# Patient Record
Sex: FEMALE | Race: WHITE | URBAN - METROPOLITAN AREA
[De-identification: names, ages, dates, MRNs, and addresses within clinical notes are randomized per-mention and may not be internally consistent; named-entity substitution may affect disease eponyms.]

---

## 2018-03-19 ENCOUNTER — TELEPHONE (OUTPATIENT)
Dept: INTERNAL MEDICINE CLINIC | Age: 68
End: 2018-03-19

## 2024-09-26 ENCOUNTER — APPOINTMENT (OUTPATIENT)
Dept: CT IMAGING | Age: 74
End: 2024-09-26
Payer: MEDICARE

## 2024-09-26 ENCOUNTER — HOSPITAL ENCOUNTER (INPATIENT)
Age: 74
LOS: 2 days | Discharge: HOME HEALTH | End: 2024-09-28
Attending: EMERGENCY MEDICINE | Admitting: INTERNAL MEDICINE
Payer: MEDICARE

## 2024-09-26 ENCOUNTER — APPOINTMENT (OUTPATIENT)
Dept: GENERAL RADIOLOGY | Age: 74
End: 2024-09-26
Payer: MEDICARE

## 2024-09-26 DIAGNOSIS — I65.02 STENOSIS OF LEFT VERTEBRAL ARTERY: ICD-10-CM

## 2024-09-26 DIAGNOSIS — R42 LIGHTHEADEDNESS: ICD-10-CM

## 2024-09-26 DIAGNOSIS — J44.1 ACUTE EXACERBATION OF CHRONIC OBSTRUCTIVE PULMONARY DISEASE (HCC): ICD-10-CM

## 2024-09-26 DIAGNOSIS — J96.22 ACUTE ON CHRONIC RESPIRATORY FAILURE WITH HYPERCAPNIA: Primary | ICD-10-CM

## 2024-09-26 DIAGNOSIS — I87.1 STENOSIS OF LEFT SUBCLAVIAN VEIN: ICD-10-CM

## 2024-09-26 PROBLEM — J96.20 ACUTE ON CHRONIC RESPIRATORY FAILURE: Status: ACTIVE | Noted: 2024-09-26

## 2024-09-26 LAB
ALBUMIN SERPL-MCNC: 3.6 G/DL (ref 3.4–5)
ALBUMIN/GLOB SERPL: 1.4 {RATIO} (ref 1.1–2.2)
ALP SERPL-CCNC: 110 U/L (ref 40–129)
ALT SERPL-CCNC: 15 U/L (ref 10–40)
ANION GAP SERPL CALCULATED.3IONS-SCNC: 4 MMOL/L (ref 3–16)
AST SERPL-CCNC: 19 U/L (ref 15–37)
BASE EXCESS BLDV CALC-SCNC: 10.7 MMOL/L
BASE EXCESS BLDV CALC-SCNC: 11.3 MMOL/L
BASOPHILS # BLD: 0 K/UL (ref 0–0.2)
BASOPHILS NFR BLD: 0.3 %
BILIRUB SERPL-MCNC: 0.3 MG/DL (ref 0–1)
BUN SERPL-MCNC: 9 MG/DL (ref 7–20)
CALCIUM SERPL-MCNC: 8.3 MG/DL (ref 8.3–10.6)
CHLORIDE SERPL-SCNC: 100 MMOL/L (ref 99–110)
CO2 BLDV-SCNC: 42 MMOL/L
CO2 BLDV-SCNC: 44 MMOL/L
CO2 SERPL-SCNC: 40 MMOL/L (ref 21–32)
COHGB MFR BLDV: 1.9 %
COHGB MFR BLDV: 2 %
CREAT SERPL-MCNC: 0.6 MG/DL (ref 0.6–1.2)
DEPRECATED RDW RBC AUTO: 14.7 % (ref 12.4–15.4)
EKG ATRIAL RATE: 72 BPM
EKG DIAGNOSIS: NORMAL
EKG P-R INTERVAL: 186 MS
EKG Q-T INTERVAL: 366 MS
EKG QRS DURATION: 78 MS
EKG QTC CALCULATION (BAZETT): 400 MS
EKG R AXIS: 36 DEGREES
EKG T AXIS: 92 DEGREES
EKG VENTRICULAR RATE: 72 BPM
EOSINOPHIL # BLD: 0.2 K/UL (ref 0–0.6)
EOSINOPHIL NFR BLD: 3 %
FLUAV RNA UPPER RESP QL NAA+PROBE: NEGATIVE
FLUBV AG NPH QL: NEGATIVE
GFR SERPLBLD CREATININE-BSD FMLA CKD-EPI: >90 ML/MIN/{1.73_M2}
GLUCOSE SERPL-MCNC: 169 MG/DL (ref 70–99)
HCO3 BLDV-SCNC: 40 MMOL/L (ref 23–29)
HCO3 BLDV-SCNC: 41 MMOL/L (ref 23–29)
HCT VFR BLD AUTO: 38.3 % (ref 36–48)
HGB BLD-MCNC: 12.2 G/DL (ref 12–16)
INR PPP: 2.04 (ref 0.85–1.15)
LYMPHOCYTES # BLD: 0.9 K/UL (ref 1–5.1)
LYMPHOCYTES NFR BLD: 16.9 %
MAGNESIUM SERPL-MCNC: 2.15 MG/DL (ref 1.8–2.4)
MCH RBC QN AUTO: 33.1 PG (ref 26–34)
MCHC RBC AUTO-ENTMCNC: 32 G/DL (ref 31–36)
MCV RBC AUTO: 103.5 FL (ref 80–100)
METHGB MFR BLDV: 0.5 %
METHGB MFR BLDV: 0.7 %
MONOCYTES # BLD: 0.4 K/UL (ref 0–1.3)
MONOCYTES NFR BLD: 7 %
NEUTROPHILS # BLD: 3.9 K/UL (ref 1.7–7.7)
NEUTROPHILS NFR BLD: 72.8 %
NT-PROBNP SERPL-MCNC: 711 PG/ML (ref 0–124)
O2 THERAPY: ABNORMAL
O2 THERAPY: ABNORMAL
PCO2 BLDV: 74.2 MMHG (ref 40–50)
PCO2 BLDV: 86.4 MMHG (ref 40–50)
PH BLDV: 7.29 [PH] (ref 7.35–7.45)
PH BLDV: 7.34 [PH] (ref 7.35–7.45)
PLATELET # BLD AUTO: 135 K/UL (ref 135–450)
PMV BLD AUTO: 10 FL (ref 5–10.5)
PO2 BLDV: 31 MMHG
PO2 BLDV: 36 MMHG
POTASSIUM SERPL-SCNC: 4.2 MMOL/L (ref 3.5–5.1)
PROCALCITONIN SERPL IA-MCNC: <0.02 NG/ML (ref 0–0.15)
PROT SERPL-MCNC: 6.2 G/DL (ref 6.4–8.2)
PROTHROMBIN TIME: 23.1 SEC (ref 11.9–14.9)
RBC # BLD AUTO: 3.7 M/UL (ref 4–5.2)
SAO2 % BLDV: 57 %
SAO2 % BLDV: 70 %
SARS-COV-2 RDRP RESP QL NAA+PROBE: NOT DETECTED
SODIUM SERPL-SCNC: 144 MMOL/L (ref 136–145)
TROPONIN, HIGH SENSITIVITY: 11 NG/L (ref 0–14)
WBC # BLD AUTO: 5.3 K/UL (ref 4–11)

## 2024-09-26 PROCEDURE — 70496 CT ANGIOGRAPHY HEAD: CPT

## 2024-09-26 PROCEDURE — 70450 CT HEAD/BRAIN W/O DYE: CPT

## 2024-09-26 PROCEDURE — 93010 ELECTROCARDIOGRAM REPORT: CPT | Performed by: INTERNAL MEDICINE

## 2024-09-26 PROCEDURE — 6360000002 HC RX W HCPCS: Performed by: INTERNAL MEDICINE

## 2024-09-26 PROCEDURE — 6370000000 HC RX 637 (ALT 250 FOR IP): Performed by: PHYSICIAN ASSISTANT

## 2024-09-26 PROCEDURE — 71045 X-RAY EXAM CHEST 1 VIEW: CPT

## 2024-09-26 PROCEDURE — 6360000004 HC RX CONTRAST MEDICATION: Performed by: PHYSICIAN ASSISTANT

## 2024-09-26 PROCEDURE — 87081 CULTURE SCREEN ONLY: CPT

## 2024-09-26 PROCEDURE — 84145 PROCALCITONIN (PCT): CPT

## 2024-09-26 PROCEDURE — 83880 ASSAY OF NATRIURETIC PEPTIDE: CPT

## 2024-09-26 PROCEDURE — 94150 VITAL CAPACITY TEST: CPT

## 2024-09-26 PROCEDURE — 36415 COLL VENOUS BLD VENIPUNCTURE: CPT

## 2024-09-26 PROCEDURE — 87635 SARS-COV-2 COVID-19 AMP PRB: CPT

## 2024-09-26 PROCEDURE — 94640 AIRWAY INHALATION TREATMENT: CPT

## 2024-09-26 PROCEDURE — 85610 PROTHROMBIN TIME: CPT

## 2024-09-26 PROCEDURE — 84484 ASSAY OF TROPONIN QUANT: CPT

## 2024-09-26 PROCEDURE — 87449 NOS EACH ORGANISM AG IA: CPT

## 2024-09-26 PROCEDURE — 82803 BLOOD GASES ANY COMBINATION: CPT

## 2024-09-26 PROCEDURE — 6370000000 HC RX 637 (ALT 250 FOR IP): Performed by: INTERNAL MEDICINE

## 2024-09-26 PROCEDURE — 6360000002 HC RX W HCPCS: Performed by: PHYSICIAN ASSISTANT

## 2024-09-26 PROCEDURE — 2700000000 HC OXYGEN THERAPY PER DAY

## 2024-09-26 PROCEDURE — 80053 COMPREHEN METABOLIC PANEL: CPT

## 2024-09-26 PROCEDURE — 6370000000 HC RX 637 (ALT 250 FOR IP): Performed by: NURSE PRACTITIONER

## 2024-09-26 PROCEDURE — 1200000000 HC SEMI PRIVATE

## 2024-09-26 PROCEDURE — 96374 THER/PROPH/DIAG INJ IV PUSH: CPT

## 2024-09-26 PROCEDURE — 87804 INFLUENZA ASSAY W/OPTIC: CPT

## 2024-09-26 PROCEDURE — 83735 ASSAY OF MAGNESIUM: CPT

## 2024-09-26 PROCEDURE — 2580000003 HC RX 258: Performed by: INTERNAL MEDICINE

## 2024-09-26 PROCEDURE — 94660 CPAP INITIATION&MGMT: CPT

## 2024-09-26 PROCEDURE — 99285 EMERGENCY DEPT VISIT HI MDM: CPT

## 2024-09-26 PROCEDURE — 94760 N-INVAS EAR/PLS OXIMETRY 1: CPT

## 2024-09-26 PROCEDURE — 96375 TX/PRO/DX INJ NEW DRUG ADDON: CPT

## 2024-09-26 PROCEDURE — 93005 ELECTROCARDIOGRAM TRACING: CPT | Performed by: EMERGENCY MEDICINE

## 2024-09-26 PROCEDURE — 87040 BLOOD CULTURE FOR BACTERIA: CPT

## 2024-09-26 PROCEDURE — 85025 COMPLETE CBC W/AUTO DIFF WBC: CPT

## 2024-09-26 RX ORDER — ROPINIROLE 1 MG/1
1 TABLET, FILM COATED ORAL NIGHTLY
Status: DISCONTINUED | OUTPATIENT
Start: 2024-09-26 | End: 2024-09-28 | Stop reason: HOSPADM

## 2024-09-26 RX ORDER — IOPAMIDOL 755 MG/ML
75 INJECTION, SOLUTION INTRAVASCULAR
Status: COMPLETED | OUTPATIENT
Start: 2024-09-26 | End: 2024-09-26

## 2024-09-26 RX ORDER — SOTALOL HYDROCHLORIDE 80 MG/1
80 TABLET ORAL 2 TIMES DAILY
COMMUNITY

## 2024-09-26 RX ORDER — MONTELUKAST SODIUM 10 MG/1
10 TABLET ORAL DAILY
COMMUNITY
Start: 2024-06-14

## 2024-09-26 RX ORDER — NICOTINE 21 MG/24HR
1 PATCH, TRANSDERMAL 24 HOURS TRANSDERMAL DAILY
Status: DISCONTINUED | OUTPATIENT
Start: 2024-09-26 | End: 2024-09-28 | Stop reason: HOSPADM

## 2024-09-26 RX ORDER — FUROSEMIDE 20 MG
20 TABLET ORAL DAILY PRN
COMMUNITY
Start: 2024-06-14

## 2024-09-26 RX ORDER — SODIUM CHLORIDE 0.9 % (FLUSH) 0.9 %
5-40 SYRINGE (ML) INJECTION EVERY 12 HOURS SCHEDULED
Status: DISCONTINUED | OUTPATIENT
Start: 2024-09-26 | End: 2024-09-28 | Stop reason: HOSPADM

## 2024-09-26 RX ORDER — BUSPIRONE HYDROCHLORIDE 5 MG/1
5 TABLET ORAL 3 TIMES DAILY
COMMUNITY
Start: 2024-06-17

## 2024-09-26 RX ORDER — ACETAMINOPHEN 650 MG/1
650 SUPPOSITORY RECTAL EVERY 6 HOURS PRN
Status: DISCONTINUED | OUTPATIENT
Start: 2024-09-26 | End: 2024-09-28 | Stop reason: HOSPADM

## 2024-09-26 RX ORDER — METHYLPREDNISOLONE SODIUM SUCCINATE 125 MG/2ML
80 INJECTION, POWDER, LYOPHILIZED, FOR SOLUTION INTRAMUSCULAR; INTRAVENOUS EVERY 8 HOURS
Status: DISCONTINUED | OUTPATIENT
Start: 2024-09-27 | End: 2024-09-27

## 2024-09-26 RX ORDER — POLYETHYLENE GLYCOL 3350 17 G/17G
17 POWDER, FOR SOLUTION ORAL DAILY PRN
Status: DISCONTINUED | OUTPATIENT
Start: 2024-09-26 | End: 2024-09-28 | Stop reason: HOSPADM

## 2024-09-26 RX ORDER — TRAMADOL HYDROCHLORIDE 50 MG/1
50 TABLET ORAL ONCE
Status: COMPLETED | OUTPATIENT
Start: 2024-09-26 | End: 2024-09-26

## 2024-09-26 RX ORDER — FLUTICASONE FUROATE, UMECLIDINIUM BROMIDE AND VILANTEROL TRIFENATATE 200; 62.5; 25 UG/1; UG/1; UG/1
1 POWDER RESPIRATORY (INHALATION) DAILY
COMMUNITY
Start: 2024-06-14

## 2024-09-26 RX ORDER — DOXYCYCLINE HYCLATE 100 MG
100 TABLET ORAL EVERY 12 HOURS SCHEDULED
Status: DISCONTINUED | OUTPATIENT
Start: 2024-09-26 | End: 2024-09-28 | Stop reason: HOSPADM

## 2024-09-26 RX ORDER — ACETAMINOPHEN 325 MG/1
650 TABLET ORAL EVERY 6 HOURS PRN
Status: DISCONTINUED | OUTPATIENT
Start: 2024-09-26 | End: 2024-09-28 | Stop reason: HOSPADM

## 2024-09-26 RX ORDER — ENOXAPARIN SODIUM 100 MG/ML
40 INJECTION SUBCUTANEOUS NIGHTLY
Status: DISCONTINUED | OUTPATIENT
Start: 2024-09-26 | End: 2024-09-26

## 2024-09-26 RX ORDER — ROPINIROLE 1 MG/1
1 TABLET, FILM COATED ORAL NIGHTLY
COMMUNITY
Start: 2024-06-14

## 2024-09-26 RX ORDER — ONDANSETRON 4 MG/1
4 TABLET, ORALLY DISINTEGRATING ORAL EVERY 8 HOURS PRN
Status: DISCONTINUED | OUTPATIENT
Start: 2024-09-26 | End: 2024-09-28 | Stop reason: HOSPADM

## 2024-09-26 RX ORDER — ACETAMINOPHEN 325 MG/1
650 TABLET ORAL ONCE
Status: COMPLETED | OUTPATIENT
Start: 2024-09-26 | End: 2024-09-26

## 2024-09-26 RX ORDER — ALBUTEROL SULFATE 90 UG/1
2 INHALANT RESPIRATORY (INHALATION) EVERY 6 HOURS PRN
Status: DISCONTINUED | OUTPATIENT
Start: 2024-09-26 | End: 2024-09-28 | Stop reason: HOSPADM

## 2024-09-26 RX ORDER — MONTELUKAST SODIUM 10 MG/1
10 TABLET ORAL DAILY
Status: DISCONTINUED | OUTPATIENT
Start: 2024-09-27 | End: 2024-09-28 | Stop reason: HOSPADM

## 2024-09-26 RX ORDER — ONDANSETRON 2 MG/ML
4 INJECTION INTRAMUSCULAR; INTRAVENOUS EVERY 6 HOURS PRN
Status: DISCONTINUED | OUTPATIENT
Start: 2024-09-26 | End: 2024-09-28 | Stop reason: HOSPADM

## 2024-09-26 RX ORDER — TRAZODONE HYDROCHLORIDE 100 MG/1
100 TABLET ORAL NIGHTLY
Status: DISCONTINUED | OUTPATIENT
Start: 2024-09-26 | End: 2024-09-28 | Stop reason: HOSPADM

## 2024-09-26 RX ORDER — BUSPIRONE HYDROCHLORIDE 5 MG/1
5 TABLET ORAL 3 TIMES DAILY
Status: DISCONTINUED | OUTPATIENT
Start: 2024-09-26 | End: 2024-09-28 | Stop reason: HOSPADM

## 2024-09-26 RX ORDER — WARFARIN SODIUM 5 MG/1
5 TABLET ORAL ONCE
Status: COMPLETED | OUTPATIENT
Start: 2024-09-26 | End: 2024-09-26

## 2024-09-26 RX ORDER — ESCITALOPRAM OXALATE 10 MG/1
10 TABLET ORAL DAILY
COMMUNITY
Start: 2024-06-14

## 2024-09-26 RX ORDER — SODIUM CHLORIDE, SODIUM LACTATE, POTASSIUM CHLORIDE, CALCIUM CHLORIDE 600; 310; 30; 20 MG/100ML; MG/100ML; MG/100ML; MG/100ML
INJECTION, SOLUTION INTRAVENOUS CONTINUOUS
Status: DISCONTINUED | OUTPATIENT
Start: 2024-09-26 | End: 2024-09-27

## 2024-09-26 RX ORDER — IPRATROPIUM BROMIDE AND ALBUTEROL SULFATE 2.5; .5 MG/3ML; MG/3ML
1 SOLUTION RESPIRATORY (INHALATION)
Status: DISCONTINUED | OUTPATIENT
Start: 2024-09-26 | End: 2024-09-26

## 2024-09-26 RX ORDER — GUAIFENESIN 600 MG/1
600 TABLET, EXTENDED RELEASE ORAL 2 TIMES DAILY
Status: DISCONTINUED | OUTPATIENT
Start: 2024-09-26 | End: 2024-09-28 | Stop reason: HOSPADM

## 2024-09-26 RX ORDER — SODIUM CHLORIDE 0.9 % (FLUSH) 0.9 %
5-40 SYRINGE (ML) INJECTION PRN
Status: DISCONTINUED | OUTPATIENT
Start: 2024-09-26 | End: 2024-09-28 | Stop reason: HOSPADM

## 2024-09-26 RX ORDER — IPRATROPIUM BROMIDE AND ALBUTEROL SULFATE 2.5; .5 MG/3ML; MG/3ML
1 SOLUTION RESPIRATORY (INHALATION) ONCE
Status: COMPLETED | OUTPATIENT
Start: 2024-09-26 | End: 2024-09-26

## 2024-09-26 RX ORDER — LEVOTHYROXINE SODIUM 50 UG/1
50 TABLET ORAL DAILY
Status: DISCONTINUED | OUTPATIENT
Start: 2024-09-27 | End: 2024-09-28 | Stop reason: HOSPADM

## 2024-09-26 RX ORDER — PROCHLORPERAZINE EDISYLATE 5 MG/ML
5 INJECTION INTRAMUSCULAR; INTRAVENOUS ONCE
Status: COMPLETED | OUTPATIENT
Start: 2024-09-26 | End: 2024-09-26

## 2024-09-26 RX ORDER — ATORVASTATIN CALCIUM 80 MG/1
80 TABLET, FILM COATED ORAL
Status: DISCONTINUED | OUTPATIENT
Start: 2024-09-26 | End: 2024-09-28 | Stop reason: HOSPADM

## 2024-09-26 RX ORDER — IPRATROPIUM BROMIDE AND ALBUTEROL SULFATE 2.5; .5 MG/3ML; MG/3ML
2 SOLUTION RESPIRATORY (INHALATION) ONCE
Status: COMPLETED | OUTPATIENT
Start: 2024-09-26 | End: 2024-09-26

## 2024-09-26 RX ORDER — ESCITALOPRAM OXALATE 10 MG/1
10 TABLET ORAL DAILY
Status: DISCONTINUED | OUTPATIENT
Start: 2024-09-27 | End: 2024-09-28 | Stop reason: HOSPADM

## 2024-09-26 RX ORDER — METHYLPREDNISOLONE SODIUM SUCCINATE 125 MG/2ML
125 INJECTION, POWDER, LYOPHILIZED, FOR SOLUTION INTRAMUSCULAR; INTRAVENOUS ONCE
Status: COMPLETED | OUTPATIENT
Start: 2024-09-26 | End: 2024-09-26

## 2024-09-26 RX ORDER — SODIUM CHLORIDE 9 MG/ML
INJECTION, SOLUTION INTRAVENOUS PRN
Status: DISCONTINUED | OUTPATIENT
Start: 2024-09-26 | End: 2024-09-28 | Stop reason: HOSPADM

## 2024-09-26 RX ORDER — BUDESONIDE AND FORMOTEROL FUMARATE DIHYDRATE 160; 4.5 UG/1; UG/1
2 AEROSOL RESPIRATORY (INHALATION)
Status: DISCONTINUED | OUTPATIENT
Start: 2024-09-26 | End: 2024-09-28 | Stop reason: HOSPADM

## 2024-09-26 RX ORDER — SOTALOL HYDROCHLORIDE 80 MG/1
80 TABLET ORAL 2 TIMES DAILY
Status: DISCONTINUED | OUTPATIENT
Start: 2024-09-26 | End: 2024-09-28 | Stop reason: HOSPADM

## 2024-09-26 RX ADMIN — ACETAMINOPHEN 325MG 650 MG: 325 TABLET ORAL at 13:30

## 2024-09-26 RX ADMIN — IPRATROPIUM BROMIDE AND ALBUTEROL SULFATE 1 DOSE: 2.5; .5 SOLUTION RESPIRATORY (INHALATION) at 14:41

## 2024-09-26 RX ADMIN — SODIUM CHLORIDE, PRESERVATIVE FREE 10 ML: 5 INJECTION INTRAVENOUS at 21:31

## 2024-09-26 RX ADMIN — BUSPIRONE HYDROCHLORIDE 5 MG: 5 TABLET ORAL at 23:53

## 2024-09-26 RX ADMIN — IPRATROPIUM BROMIDE AND ALBUTEROL SULFATE 1 DOSE: 2.5; .5 SOLUTION RESPIRATORY (INHALATION) at 19:42

## 2024-09-26 RX ADMIN — PROCHLORPERAZINE EDISYLATE 5 MG: 5 INJECTION INTRAMUSCULAR; INTRAVENOUS at 16:58

## 2024-09-26 RX ADMIN — IOPAMIDOL 75 ML: 755 INJECTION, SOLUTION INTRAVENOUS at 13:55

## 2024-09-26 RX ADMIN — TRAMADOL HYDROCHLORIDE 50 MG: 50 TABLET ORAL at 23:53

## 2024-09-26 RX ADMIN — SODIUM CHLORIDE, POTASSIUM CHLORIDE, SODIUM LACTATE AND CALCIUM CHLORIDE: 600; 310; 30; 20 INJECTION, SOLUTION INTRAVENOUS at 21:34

## 2024-09-26 RX ADMIN — GUAIFENESIN 600 MG: 600 TABLET ORAL at 21:17

## 2024-09-26 RX ADMIN — ATORVASTATIN CALCIUM 80 MG: 80 TABLET, FILM COATED ORAL at 23:53

## 2024-09-26 RX ADMIN — TRAZODONE HYDROCHLORIDE 100 MG: 100 TABLET ORAL at 23:53

## 2024-09-26 RX ADMIN — IPRATROPIUM BROMIDE AND ALBUTEROL SULFATE 2 DOSE: 2.5; .5 SOLUTION RESPIRATORY (INHALATION) at 16:22

## 2024-09-26 RX ADMIN — ACETAMINOPHEN 325MG 650 MG: 325 TABLET ORAL at 21:18

## 2024-09-26 RX ADMIN — WARFARIN SODIUM 5 MG: 5 TABLET ORAL at 23:53

## 2024-09-26 RX ADMIN — ROPINIROLE 1 MG: 1 TABLET, FILM COATED ORAL at 23:54

## 2024-09-26 RX ADMIN — DOXYCYCLINE HYCLATE 100 MG: 100 TABLET, COATED ORAL at 21:17

## 2024-09-26 RX ADMIN — METHYLPREDNISOLONE SODIUM SUCCINATE 125 MG: 125 INJECTION INTRAMUSCULAR; INTRAVENOUS at 16:36

## 2024-09-26 RX ADMIN — WATER 1000 MG: 1 INJECTION INTRAMUSCULAR; INTRAVENOUS; SUBCUTANEOUS at 21:18

## 2024-09-26 ASSESSMENT — PAIN DESCRIPTION - ORIENTATION
ORIENTATION: LEFT;RIGHT
ORIENTATION: OTHER (COMMENT)
ORIENTATION: MID;LOWER
ORIENTATION: MID
ORIENTATION: LEFT;RIGHT

## 2024-09-26 ASSESSMENT — LIFESTYLE VARIABLES
HOW MANY STANDARD DRINKS CONTAINING ALCOHOL DO YOU HAVE ON A TYPICAL DAY: 1 OR 2
HOW OFTEN DO YOU HAVE A DRINK CONTAINING ALCOHOL: MONTHLY OR LESS

## 2024-09-26 ASSESSMENT — PAIN SCALES - GENERAL
PAINLEVEL_OUTOF10: 5
PAINLEVEL_OUTOF10: 6
PAINLEVEL_OUTOF10: 6
PAINLEVEL_OUTOF10: 7
PAINLEVEL_OUTOF10: 7
PAINLEVEL_OUTOF10: 10
PAINLEVEL_OUTOF10: 6

## 2024-09-26 ASSESSMENT — PAIN DESCRIPTION - PAIN TYPE
TYPE: CHRONIC PAIN
TYPE: CHRONIC PAIN

## 2024-09-26 ASSESSMENT — PAIN - FUNCTIONAL ASSESSMENT
PAIN_FUNCTIONAL_ASSESSMENT: ACTIVITIES ARE NOT PREVENTED
PAIN_FUNCTIONAL_ASSESSMENT: 0-10

## 2024-09-26 ASSESSMENT — PAIN DESCRIPTION - ONSET: ONSET: ON-GOING

## 2024-09-26 ASSESSMENT — PAIN DESCRIPTION - DESCRIPTORS
DESCRIPTORS: ACHING

## 2024-09-26 ASSESSMENT — PAIN DESCRIPTION - LOCATION
LOCATION: HEAD;LEG
LOCATION: HEAD;BACK
LOCATION: BACK
LOCATION: HEAD

## 2024-09-26 ASSESSMENT — PAIN DESCRIPTION - FREQUENCY: FREQUENCY: CONTINUOUS

## 2024-09-26 ASSESSMENT — PAIN DESCRIPTION - DIRECTION: RADIATING_TOWARDS: NO

## 2024-09-26 NOTE — PROGRESS NOTES
Pt started on bipap at this time, pt refusing full face mask. Doing well with nasal mask, minimal leak.

## 2024-09-26 NOTE — ED NOTES
ED SBAR report provider to REGINO Carrillo. Patient to be transported to Room 5281 via stretcher by RN.Patient transported with bedside cardiac monitor. IV site clean, dry, and intact. MEWS score and pain assessed as 4/10 and documented. Updated patient on plan of care.

## 2024-09-26 NOTE — PROGRESS NOTES
Patient arrived to unit at approximately 1855 via stretcher. Patient alert and oriented x4, on 2 liters nasal cannula upon arrival.Patient c/o head and back pain which patient stated is chronic pain. Skin assessment completed upon arrival and documented. Telemetry hooked up , call light in reach and patient oriented to room and plan of care.

## 2024-09-26 NOTE — PROGRESS NOTES
[facility]  Respiratory Therapy  Patient Education      Name:  Amanda Smith  Medical Record Number:  [id number]  Age: 73 y.o.   : 1950  Today's Date:  2024  Room:  Y7H-0746/5281-01         Assessment      BP (!) 160/80   Pulse 77   Temp 98.2 °F (36.8 °C) (Oral)   Resp 17   Ht (S) 1.651 m (5' 5\")   Wt (S) 79.6 kg (175 lb 7.8 oz)   SpO2 98%   BMI 29.20 kg/m²     Patient Active Problem List   Diagnosis    Cardiac dysrhythmia    Other and unspecified hyperlipidemia    Hearing loss    Diverticulitis of colon    Hemorrhoids    COPD (chronic obstructive pulmonary disease) (HCC)    Pneumonia due to organism    Spinal stenosis, lumbar region, without neurogenic claudication    Mononeuritis    S/P appendectomy    History of back surgery    History of thyroidectomy, total    Edema    Acute on chronic respiratory failure (HCC)       Social History  Social History     Tobacco Use    Smoking status: Every Day     Current packs/day: 1.00     Average packs/day: 1 pack/day for 30.0 years (30.0 ttl pk-yrs)     Types: Cigarettes   Substance Use Topics    Alcohol use: Yes     Comment: Occasional alchol use       Modality:     HHN and IS      Education       Patient/caregiver was educated on the proper method of use:  Yes        Level of patient/caregiver understanding able to:  Verbalize understanding and Demonstrate understanding     Education status:   Provided instructions on medications, Provided instructions on technique and indications, and Provided instructions on adverse reactions      Response to education:    Very Good     Teaching Time: 5  minutes         Electronically signed by Mahi Gomez RCP on 2024 at 7:53 PM

## 2024-09-26 NOTE — H&P
Hospital Medicine History & Physical      PCP: Zackery Powers MD    Date of Admission: 9/26/2024    Date of Service: Pt seen/examined and Admitted with expected LOS greater than two midnights due to medical therapy.     Chief Complaint:      Shortness of breath  History Of Present Illness:      73 y.o. female who presented to the emergency room with a chief complaint of worsening of shortness of breath associated with lightheadedness which started 2 days ago.  Patient states that she is on home oxygen 3 L chronically due to her history of COPD.    EMS was called due to worsening of shortness of breath by the time EMS arrived patient was noticed to be in respiratory distress with bilateral wheezing.  Patient received breathing treatment and also she has been complaining of dizziness which she describes as lightheadedness started yesterday around 3 PM  Patient states that every time she sits up and stands up she feels lightheaded and dizzy she denies having any thoughts of vertigo.      Patient denies any muscle bruisability no loss of sensation no loss of consciousness no blurry vision double vision or visual changes.  Patient denies any muscle weakness or paralysis.  Patient is currently anticoagulated on warfarin due to her history of chronic atrial fibrillation.  She has been also noticing some headaches bilaterally bitemporal since yesterday.  Patient is currently on sotalol for the A-fib        The workup in the emergency room notable for:    Upon arrival to the emergency room patient was started with intense nebulizers O2 supplement therapy  VBG showed pH 7.28 pCO2 of 86 patient started on BiPAP ventilation in the emergency room and she tolerated it well.      Patient past medical history significant for:  Past Medical History:          Diagnosis Date    Cardiac dysrhythmia, unspecified     Chronic airway obstruction, not elsewhere classified (HCC)     Diverticulitis of colon (without mention of  normal.  No rashes or lesions.  Neurologic:  Neurovascularly intact, grossly non-focal.  Psychiatric:  Alert and oriented, thought content appropriate, normal insight  Capillary Refill: Brisk,3 seconds, normal  Peripheral Pulses: +2 palpable, equal bilaterally       Labs:     Recent Labs     09/26/24  1307   WBC 5.3   HGB 12.2   HCT 38.3        Recent Labs     09/26/24  1307      K 4.2      CO2 40*   BUN 9   CREATININE 0.6   CALCIUM 8.3     Recent Labs     09/26/24  1307   AST 19   ALT 15   BILITOT 0.3   ALKPHOS 110     Recent Labs     09/26/24  1345   INR 2.04*     Recent Labs     09/26/24  1307   TROPHS 11       Urinalysis:    No results found for: \"NITRU\", \"WBCUA\", \"BACTERIA\", \"RBCUA\", \"BLOODU\", \"SPECGRAV\", \"GLUCOSEU\"    Radiology:     CXR: I have reviewed the CXR.   EKG:  I have reviewed the EKG.     CT HEAD WO CONTRAST   Final Result   1. No acute intracranial abnormality.   2.  50% stenosis at the origin of the left vertebral artery.   3. 40% stenosis in the proximal left subclavian artery.   4. No acute abnormality or flow-limiting stenosis of the major arteries of   the head.   5. Bilateral mastoid effusions.         CTA HEAD NECK W CONTRAST   Final Result   1. No acute intracranial abnormality.   2.  50% stenosis at the origin of the left vertebral artery.   3. 40% stenosis in the proximal left subclavian artery.   4. No acute abnormality or flow-limiting stenosis of the major arteries of   the head.   5. Bilateral mastoid effusions.         XR CHEST PORTABLE   Final Result   No acute cardiopulmonary process.             Consults:    None    ASSESSMENT:    Active Hospital Problems    Diagnosis Date Noted    Acute on chronic respiratory failure ([facility]) [J96.20] 09/26/2024     Acute on chronic respiratory failure with hypercapnia  Acute exacerbation of COPD on 3 L home O2  Lightheadedness  50% stenosis at the origin of the left vertebral artery  40% stenosis in the proximal left subclavian

## 2024-09-26 NOTE — ED NOTES
Pharmacy Medication Reconciliation Note     List of medications patient is currently taking is complete.    Source of information:   EMR  Patient     Notes regarding home medications:   Reports taking her morning medications today, except ones she ran out of - lexapro, levothyroxine and montelukast   On warfarin for hx of afib - current dose is 5 mg daily except 7.5 mg Weds and Saturday**        Mj Jenkins, PharmD  9/26/2024  5:57 PM

## 2024-09-26 NOTE — ED TRIAGE NOTES
Pt into ED via EMS for Shortness of breath. Pt states she has usually feels short of breath but it has worsened the past couple of days. Upon arrival EMS states pt was wheezing and gave a breathing treatment. Pt has a hx of COPD and normally wears 3 L of O2. Pt has breathing treatments at home but those did not help. Pt is also experiencing vomiting and diarrhea the past couple of days. Pt is Is 95% on 3L. Pt is afebrile. Pt is A&O x4.

## 2024-09-26 NOTE — ED PROVIDER NOTES
ED Attending Attestation Note    This patient was seen by the advanced practice provider.     I personally saw the patient. Management plan and care decisions have been discussed with me and I made/approved the management plan and take responsibility for patient management.     Briefly, 73 y.o. female on warfarin for AF, COPD on 3L baseline presents with dyspnea and ?dizziness. SOB x 2-3 days acute on chronic. 3L NC baseline. Yesterday 3 PM - started to feel lightheaded denies vertigo, reports frontal bitemporal headache, Denies weakness/vision/speech changes.       Focused exam:   Gen: 73 y.o. female, chronically ill-appearing  HEENT: NCAT. MMM, PERRL. EOMI.   CV: RRR w/o MRG  Vascular: intact and symmetric radial and DP pulses bilaterally  Lungs: Poor air movement, biphasic wheezing, no rales or rhonchi  Abdomen: Soft, nontender, nondistended. No rebound/guarding.   Neuro: awake and alert, speech clear w/o aphasia; intact and symmetric strength and sensation in all 4 extremities, CN 2-12 intact bilaterally  NIHSS 0, test of skew negative, no truncal instability       MDM:   This is a 73-year-old presenting with dyspnea and lightheadedness.  Upon presentation, she was saturating 95% on her baseline O2 requirements.  She did have poor air movement and expiratory wheezing.  She was given bronchodilators.  VBG shows acute on chronic CO2 retention with pH of 7.28, pCO2 of 86, patient does have a degree of chronicity given her bicarb of 40.  Chest x-ray is unremarkable.  She clinically does not appear fluid overloaded.    Nursing notes, vital signs reviewed.     Records reviewed:   Cardiac catheterization 7/29/2021 showed scattered nonocclusive CAD, normal LV function, mildly elevated LV EDP, normal systemic pressures and mild postcapillary pulmonary hypertension    I independently interpreted the following studies   CT HEAD WO CONTRAST   Final Result   1. No acute intracranial abnormality.   2.  50% stenosis at the

## 2024-09-26 NOTE — ED NOTES
Per RT, pt is to be removed from bipap and taken up to floor on NC. Mask removed by RT. EDMD aware

## 2024-09-26 NOTE — ED NOTES
Call placed to lab regarding delay results of BNP. Per lab, they are unsure why blood work was not ran. Running at this time

## 2024-09-26 NOTE — PROGRESS NOTES
Pt taken off bipap for transport to floor. VBG better as of 1730. Pt is alert and oriented, tolerating 3lpm NC well       Latest Reference Range & Units 09/26/24 17:30   pH, Vincenzo 7.350 - 7.450  7.338 (L)   pCO2, Vincenzo 40.0 - 50.0 mmHg 74.2 (H)   pO2, Vincenzo Not Established mmHg 36   Bicarbonate, Venous 23 - 29 mmol/L 40 (H)   TC02 (Calc), Vincenzo Not Established mmol/L 42   Base Excess, Vincenzo Not Established mmol/L 11.3   MetHgb, Vincenzo <1.5 % 0.7   O2 Saturation Venous Not Established % 70   (L): Data is abnormally low  (H): Data is abnormally high

## 2024-09-26 NOTE — PROGRESS NOTES
4 Eyes Skin Assessment     NAME:  Amanda Smith  YOB: 1950  MEDICAL RECORD NUMBER:  [id number]    The patient is being assessed for  Admission    I agree that at least one RN has performed a thorough Head to Toe Skin Assessment on the patient. ALL assessment sites listed below have been assessed.      Areas assessed by both nurses:    Head, Face, Ears, Shoulders, Back, Chest, Arms, Elbows, Hands, Sacrum. Buttock, Coccyx, Ischium, Legs. Feet and Heels, and Under Medical Devices implanted device in lower back/buttocks, scattered bruises and redness on arms ,no open areas noted.        Does the Patient have a Wound? No noted wound(s)       Bernardo Prevention initiated by RN: No  Wound Care Orders initiated by RN: No    Pressure Injury (Stage 3,4, Unstageable, DTI, NWPT, and Complex wounds) if present, place Wound referral order by RN under : No    New Ostomies, if present place, Ostomy referral order under : No     Nurse 1 eSignature: Electronically signed by Charmaine Hernández RN on 9/26/24 at 7:07 PM EDT    **SHARE this note so that the co-signing nurse can place an eSignature**    Nurse 2 eSignature: Electronically signed by Mila Vidales RN on 9/26/24 at 7:35 PM EDT

## 2024-09-26 NOTE — ED PROVIDER NOTES
[facility] EMERGENCY DEPARTMENT  EMERGENCY DEPARTMENT ENCOUNTER        Pt Name: Amanda Smith  MRN: [id number]  Birthdate 1950  Date of evaluation: 9/26/2024  Provider: Joanna Willis PA-C  PCP: Zackery Powers MD  Note Started: 1:31 PM EDT 9/26/24       I have seen and evaluated this patient with my supervising physician Maryan Grimm MD.      CHIEF COMPLAINT       Chief Complaint   Patient presents with    Shortness of Breath     Pt into ED via EMS for Shortness of breath. Pt states she has usually feels short of breath but it has worsened the past couple of days.        HISTORY OF PRESENT ILLNESS: 1 or more Elements     History From: patient, EMS    Amanda Smith is a 73 y.o. female who presents for shortness of breath and lightheadedness.  Shortness of breath started 2 days ago.  She is on oxygen 3 L chronically.   Hx of COPD.  EMS reported wheezing and gave breathing treatment.  Patietn also reports dizziness described as lightheadedness that started yesterday around 3 pm.  The lightheadedness occurs every time she sits up or stands up.  She denies vertigo, vision changes, numbness, slurred speech, difficulty talking. She also reports a headache located in the bitemporal area that started yesterday around 3 pm also.  She is anticoagulated on warfarin for hx of afib.  Also on sotalol for the afib. Denies prior episode of the lightheadedness    Nursing Notes were reviewed and agreed with or any disagreements were addressed in the HPI.    REVIEW OF SYSTEMS :      Review of Systems    Positives and Pertinent negatives as per HPI.     SURGICAL HISTORY     Past Surgical History:   Procedure Laterality Date    APPENDECTOMY      BACK SURGERY      COLECTOMY      COLON SURGERY      COLOSTOMY      THYROIDECTOMY         CURRENTMEDICATIONS       Previous Medications    ALBUTEROL (PROVENTIL HFA;VENTOLIN HFA) 108 (90 BASE) MCG/ACT INHALER    Inhale 2 puffs into the

## 2024-09-27 LAB
ALBUMIN SERPL-MCNC: 3.3 G/DL (ref 3.4–5)
ALBUMIN/GLOB SERPL: 1.4 {RATIO} (ref 1.1–2.2)
ALP SERPL-CCNC: 76 U/L (ref 40–129)
ALT SERPL-CCNC: 13 U/L (ref 10–40)
ANION GAP SERPL CALCULATED.3IONS-SCNC: 6 MMOL/L (ref 3–16)
AST SERPL-CCNC: 22 U/L (ref 15–37)
BACTERIA BLD CULT ORG #2: NORMAL
BACTERIA BLD CULT: NORMAL
BASOPHILS # BLD: 0 K/UL (ref 0–0.2)
BASOPHILS NFR BLD: 0.1 %
BILIRUB SERPL-MCNC: 0.3 MG/DL (ref 0–1)
BUN SERPL-MCNC: 9 MG/DL (ref 7–20)
CALCIUM SERPL-MCNC: 8.4 MG/DL (ref 8.3–10.6)
CHLORIDE SERPL-SCNC: 102 MMOL/L (ref 99–110)
CO2 SERPL-SCNC: 34 MMOL/L (ref 21–32)
CREAT SERPL-MCNC: <0.5 MG/DL (ref 0.6–1.2)
DEPRECATED RDW RBC AUTO: 13.8 % (ref 12.4–15.4)
EOSINOPHIL # BLD: 0 K/UL (ref 0–0.6)
EOSINOPHIL NFR BLD: 0 %
GFR SERPLBLD CREATININE-BSD FMLA CKD-EPI: >90 ML/MIN/{1.73_M2}
GLUCOSE SERPL-MCNC: 180 MG/DL (ref 70–99)
HCT VFR BLD AUTO: 33.8 % (ref 36–48)
HGB BLD-MCNC: 11.2 G/DL (ref 12–16)
INR PPP: 2.1 (ref 0.85–1.15)
LEGIONELLA AG UR QL: NORMAL
LYMPHOCYTES # BLD: 0.8 K/UL (ref 1–5.1)
LYMPHOCYTES NFR BLD: 17.2 %
MCH RBC QN AUTO: 33.7 PG (ref 26–34)
MCHC RBC AUTO-ENTMCNC: 33.2 G/DL (ref 31–36)
MCV RBC AUTO: 101.5 FL (ref 80–100)
MONOCYTES # BLD: 0 K/UL (ref 0–1.3)
MONOCYTES NFR BLD: 0.8 %
NEUTROPHILS # BLD: 3.8 K/UL (ref 1.7–7.7)
NEUTROPHILS NFR BLD: 81.9 %
ORGANISM: ABNORMAL
PLATELET # BLD AUTO: 110 K/UL (ref 135–450)
PMV BLD AUTO: 10.6 FL (ref 5–10.5)
POTASSIUM SERPL-SCNC: 3.8 MMOL/L (ref 3.5–5.1)
PROCALCITONIN SERPL IA-MCNC: <0.02 NG/ML (ref 0–0.15)
PROT SERPL-MCNC: 5.6 G/DL (ref 6.4–8.2)
PROTHROMBIN TIME: 23.6 SEC (ref 11.9–14.9)
RBC # BLD AUTO: 3.33 M/UL (ref 4–5.2)
REPORT: NORMAL
RESP PATH DNA+RNA PNL L RESP NAA+NON-PRB: ABNORMAL
S PNEUM AG UR QL: NORMAL
SODIUM SERPL-SCNC: 142 MMOL/L (ref 136–145)
WBC # BLD AUTO: 4.6 K/UL (ref 4–11)

## 2024-09-27 PROCEDURE — 94640 AIRWAY INHALATION TREATMENT: CPT

## 2024-09-27 PROCEDURE — 85610 PROTHROMBIN TIME: CPT

## 2024-09-27 PROCEDURE — 2580000003 HC RX 258: Performed by: STUDENT IN AN ORGANIZED HEALTH CARE EDUCATION/TRAINING PROGRAM

## 2024-09-27 PROCEDURE — 97161 PT EVAL LOW COMPLEX 20 MIN: CPT

## 2024-09-27 PROCEDURE — 6370000000 HC RX 637 (ALT 250 FOR IP): Performed by: INTERNAL MEDICINE

## 2024-09-27 PROCEDURE — 6370000000 HC RX 637 (ALT 250 FOR IP): Performed by: NURSE PRACTITIONER

## 2024-09-27 PROCEDURE — 99222 1ST HOSP IP/OBS MODERATE 55: CPT | Performed by: SURGERY

## 2024-09-27 PROCEDURE — 97535 SELF CARE MNGMENT TRAINING: CPT

## 2024-09-27 PROCEDURE — 2700000000 HC OXYGEN THERAPY PER DAY

## 2024-09-27 PROCEDURE — 97165 OT EVAL LOW COMPLEX 30 MIN: CPT

## 2024-09-27 PROCEDURE — 36415 COLL VENOUS BLD VENIPUNCTURE: CPT

## 2024-09-27 PROCEDURE — 87633 RESP VIRUS 12-25 TARGETS: CPT

## 2024-09-27 PROCEDURE — 6360000002 HC RX W HCPCS: Performed by: STUDENT IN AN ORGANIZED HEALTH CARE EDUCATION/TRAINING PROGRAM

## 2024-09-27 PROCEDURE — 2580000003 HC RX 258

## 2024-09-27 PROCEDURE — 6360000002 HC RX W HCPCS: Performed by: INTERNAL MEDICINE

## 2024-09-27 PROCEDURE — 97530 THERAPEUTIC ACTIVITIES: CPT

## 2024-09-27 PROCEDURE — 80053 COMPREHEN METABOLIC PANEL: CPT

## 2024-09-27 PROCEDURE — 2580000003 HC RX 258: Performed by: INTERNAL MEDICINE

## 2024-09-27 PROCEDURE — 85025 COMPLETE CBC W/AUTO DIFF WBC: CPT

## 2024-09-27 PROCEDURE — 84145 PROCALCITONIN (PCT): CPT

## 2024-09-27 PROCEDURE — 94760 N-INVAS EAR/PLS OXIMETRY 1: CPT

## 2024-09-27 PROCEDURE — 1200000000 HC SEMI PRIVATE

## 2024-09-27 RX ORDER — WATER 10 ML/10ML
INJECTION INTRAMUSCULAR; INTRAVENOUS; SUBCUTANEOUS
Status: COMPLETED
Start: 2024-09-27 | End: 2024-09-27

## 2024-09-27 RX ORDER — KETOROLAC TROMETHAMINE 15 MG/ML
15 INJECTION, SOLUTION INTRAMUSCULAR; INTRAVENOUS EVERY 6 HOURS PRN
Status: DISCONTINUED | OUTPATIENT
Start: 2024-09-27 | End: 2024-09-28 | Stop reason: HOSPADM

## 2024-09-27 RX ORDER — WARFARIN SODIUM 5 MG/1
5 TABLET ORAL ONCE
Status: COMPLETED | OUTPATIENT
Start: 2024-09-27 | End: 2024-09-27

## 2024-09-27 RX ORDER — CLONAZEPAM 1 MG/1
1 TABLET ORAL 4 TIMES DAILY
Status: DISCONTINUED | OUTPATIENT
Start: 2024-09-27 | End: 2024-09-27

## 2024-09-27 RX ORDER — PREDNISONE 20 MG/1
40 TABLET ORAL DAILY
Status: DISCONTINUED | OUTPATIENT
Start: 2024-09-28 | End: 2024-09-28 | Stop reason: HOSPADM

## 2024-09-27 RX ORDER — OXYCODONE AND ACETAMINOPHEN 10; 325 MG/1; MG/1
1 TABLET ORAL EVERY 6 HOURS PRN
Status: DISCONTINUED | OUTPATIENT
Start: 2024-09-27 | End: 2024-09-27

## 2024-09-27 RX ORDER — METHYLPREDNISOLONE SODIUM SUCCINATE 125 MG/2ML
60 INJECTION, POWDER, LYOPHILIZED, FOR SOLUTION INTRAMUSCULAR; INTRAVENOUS ONCE
Status: COMPLETED | OUTPATIENT
Start: 2024-09-27 | End: 2024-09-27

## 2024-09-27 RX ORDER — WARFARIN SODIUM 5 MG/1
5 TABLET ORAL ONCE
Status: DISCONTINUED | OUTPATIENT
Start: 2024-09-27 | End: 2024-09-27

## 2024-09-27 RX ORDER — DILTIAZEM HYDROCHLORIDE 180 MG/1
180 CAPSULE, COATED, EXTENDED RELEASE ORAL DAILY
Status: DISCONTINUED | OUTPATIENT
Start: 2024-09-27 | End: 2024-09-27

## 2024-09-27 RX ADMIN — WATER 10 ML: 1 INJECTION INTRAMUSCULAR; INTRAVENOUS; SUBCUTANEOUS at 18:42

## 2024-09-27 RX ADMIN — WATER 10 ML: 1 INJECTION INTRAMUSCULAR; INTRAVENOUS; SUBCUTANEOUS at 08:27

## 2024-09-27 RX ADMIN — BUSPIRONE HYDROCHLORIDE 5 MG: 5 TABLET ORAL at 20:21

## 2024-09-27 RX ADMIN — WARFARIN SODIUM 5 MG: 5 TABLET ORAL at 18:40

## 2024-09-27 RX ADMIN — METHYLPREDNISOLONE SODIUM SUCCINATE 80 MG: 125 INJECTION INTRAMUSCULAR; INTRAVENOUS at 08:27

## 2024-09-27 RX ADMIN — DOXYCYCLINE HYCLATE 100 MG: 100 TABLET, COATED ORAL at 20:21

## 2024-09-27 RX ADMIN — GUAIFENESIN 600 MG: 600 TABLET ORAL at 20:22

## 2024-09-27 RX ADMIN — SOTALOL HYDROCHLORIDE 80 MG: 80 TABLET ORAL at 08:27

## 2024-09-27 RX ADMIN — METHYLPREDNISOLONE SODIUM SUCCINATE 60 MG: 125 INJECTION INTRAMUSCULAR; INTRAVENOUS at 18:41

## 2024-09-27 RX ADMIN — BUSPIRONE HYDROCHLORIDE 5 MG: 5 TABLET ORAL at 08:26

## 2024-09-27 RX ADMIN — ROPINIROLE 1 MG: 1 TABLET, FILM COATED ORAL at 20:22

## 2024-09-27 RX ADMIN — TRAZODONE HYDROCHLORIDE 100 MG: 100 TABLET ORAL at 20:21

## 2024-09-27 RX ADMIN — WATER 5 ML: 1 INJECTION INTRAMUSCULAR; INTRAVENOUS; SUBCUTANEOUS at 01:19

## 2024-09-27 RX ADMIN — LEVOTHYROXINE SODIUM 50 MCG: 0.05 TABLET ORAL at 06:19

## 2024-09-27 RX ADMIN — SODIUM CHLORIDE, PRESERVATIVE FREE 10 ML: 5 INJECTION INTRAVENOUS at 08:28

## 2024-09-27 RX ADMIN — MONTELUKAST 10 MG: 10 TABLET, FILM COATED ORAL at 08:27

## 2024-09-27 RX ADMIN — KETOROLAC TROMETHAMINE 15 MG: 15 INJECTION, SOLUTION INTRAMUSCULAR; INTRAVENOUS at 12:28

## 2024-09-27 RX ADMIN — GUAIFENESIN 600 MG: 600 TABLET ORAL at 08:26

## 2024-09-27 RX ADMIN — WATER 1000 MG: 1 INJECTION INTRAMUSCULAR; INTRAVENOUS; SUBCUTANEOUS at 20:22

## 2024-09-27 RX ADMIN — SOTALOL HYDROCHLORIDE 80 MG: 80 TABLET ORAL at 00:05

## 2024-09-27 RX ADMIN — BUSPIRONE HYDROCHLORIDE 5 MG: 5 TABLET ORAL at 13:54

## 2024-09-27 RX ADMIN — SODIUM CHLORIDE, POTASSIUM CHLORIDE, SODIUM LACTATE AND CALCIUM CHLORIDE: 600; 310; 30; 20 INJECTION, SOLUTION INTRAVENOUS at 06:19

## 2024-09-27 RX ADMIN — TIOTROPIUM BROMIDE INHALATION SPRAY 2 PUFF: 3.12 SPRAY, METERED RESPIRATORY (INHALATION) at 10:23

## 2024-09-27 RX ADMIN — BUDESONIDE AND FORMOTEROL FUMARATE DIHYDRATE 2 PUFF: 160; 4.5 AEROSOL RESPIRATORY (INHALATION) at 10:23

## 2024-09-27 RX ADMIN — SOTALOL HYDROCHLORIDE 80 MG: 80 TABLET ORAL at 20:22

## 2024-09-27 RX ADMIN — BUDESONIDE AND FORMOTEROL FUMARATE DIHYDRATE 2 PUFF: 160; 4.5 AEROSOL RESPIRATORY (INHALATION) at 19:24

## 2024-09-27 RX ADMIN — SODIUM CHLORIDE, PRESERVATIVE FREE 10 ML: 5 INJECTION INTRAVENOUS at 20:22

## 2024-09-27 RX ADMIN — ESCITALOPRAM OXALATE 10 MG: 10 TABLET ORAL at 08:26

## 2024-09-27 RX ADMIN — ATORVASTATIN CALCIUM 80 MG: 80 TABLET, FILM COATED ORAL at 20:21

## 2024-09-27 RX ADMIN — KETOROLAC TROMETHAMINE 15 MG: 15 INJECTION, SOLUTION INTRAMUSCULAR; INTRAVENOUS at 18:43

## 2024-09-27 RX ADMIN — METHYLPREDNISOLONE SODIUM SUCCINATE 80 MG: 125 INJECTION INTRAMUSCULAR; INTRAVENOUS at 00:05

## 2024-09-27 RX ADMIN — DOXYCYCLINE HYCLATE 100 MG: 100 TABLET, COATED ORAL at 08:26

## 2024-09-27 ASSESSMENT — PAIN SCALES - WONG BAKER
WONGBAKER_NUMERICALRESPONSE: NO HURT
WONGBAKER_NUMERICALRESPONSE: NO HURT

## 2024-09-27 ASSESSMENT — ENCOUNTER SYMPTOMS
GASTROINTESTINAL NEGATIVE: 1
RESPIRATORY NEGATIVE: 1

## 2024-09-27 ASSESSMENT — PAIN - FUNCTIONAL ASSESSMENT
PAIN_FUNCTIONAL_ASSESSMENT: ACTIVITIES ARE NOT PREVENTED
PAIN_FUNCTIONAL_ASSESSMENT: ACTIVITIES ARE NOT PREVENTED

## 2024-09-27 ASSESSMENT — PAIN DESCRIPTION - DESCRIPTORS
DESCRIPTORS: ACHING
DESCRIPTORS: ACHING;DISCOMFORT

## 2024-09-27 ASSESSMENT — PAIN DESCRIPTION - LOCATION
LOCATION: HEAD;BACK
LOCATION: HEAD

## 2024-09-27 ASSESSMENT — PAIN SCALES - GENERAL
PAINLEVEL_OUTOF10: 6
PAINLEVEL_OUTOF10: 8
PAINLEVEL_OUTOF10: 8
PAINLEVEL_OUTOF10: 9

## 2024-09-27 ASSESSMENT — PAIN DESCRIPTION - ORIENTATION
ORIENTATION: POSTERIOR;LOWER
ORIENTATION: RIGHT;LEFT

## 2024-09-27 NOTE — PROGRESS NOTES
Patient resting in bed at this time. Worked with therapy this morning and walking around in room for bathroom needs with walker x1. Patient has chronic pain. Offered Tylenol and declined.

## 2024-09-27 NOTE — CARE COORDINATION
09/27/24 5750   Service Assessment   Patient Orientation Alert and Oriented   Cognition Alert   History Provided By Patient;Child/Family  (son)   Primary Caregiver Self   Accompanied By/Relationship son   Support Systems Children  (son)   Patient's Healthcare Decision Maker is: Legal Next of Kin   PCP Verified by CM Yes  (Dr Powers  6-2024)   Last Visit to PCP Within last 3 months   Prior Functional Level Independent in ADLs/IADLs   Current Functional Level Independent in ADLs/IADLs   Can patient return to prior living arrangement Yes   Ability to make needs known: Good   Family able to assist with home care needs: Yes   Would you like for me to discuss the discharge plan with any other family members/significant others, and if so, who? Yes  (son)   Financial Resources Medicare   Community Resources None   Discharge Planning   Type of Residence House   Living Arrangements Children   Current Services Prior To Admission Durable Medical Equipment   Current DME Prior to Arrival Oxygen Therapy (Comment);Hospital Bed;Walker;Wheelchair;Cane;Shower Chair   Potential Assistance Needed Home Care   DME Ordered? No   Potential Assistance Purchasing Medications No   Type of Home Care Services PT;OT   Patient expects to be discharged to: House   One/Two Story Residence One story   History of falls? 1   Services At/After Discharge   Transition of Care Consult (CM Consult) Home Health  (Wants to use [facility])   Internal Home Health Yes   Services At/After Discharge Home Health   [place] Resource Information Provided? No   Mode of Transport at Discharge Other (see comment)  (family)   Confirm Follow Up Transport Family   Condition of Participation: Discharge Planning   The Plan for Transition of Care is related to the following treatment goals: Therapy recommends pt/ot services at home to progress in personal care and ambulation needs in home setting.   The Patient and/or Patient Representative was provided with a Choice of

## 2024-09-27 NOTE — DISCHARGE INSTR - COC
Continuity of Care Form    Patient Name: Amanda Smith   :  1950  MRN:  [id number]    Admit date:  2024  Discharge date:  ***    Code Status Order: Full Code   Advance Directives:   Advance Care Flowsheet Documentation             Admitting Physician:  Jennifer Larkin MD  PCP: Zackery Powers MD    Discharging Nurse: ***  Discharging Hospital Unit/Room#: R6N-0461/5281-01  Discharging Unit Phone Number: ***    Emergency Contact:   Extended Emergency Contact Information  Primary Emergency Contact: Ruddy Smith  Address: [address] [place] [address]           [place] [address] [facility]  Home Phone: [phone]  Work Phone: [phone]  Mobile Phone: [phone]  Relation: Spouse    Past Surgical History:  Past Surgical History:   Procedure Laterality Date    APPENDECTOMY      BACK SURGERY      COLECTOMY      COLON SURGERY      COLOSTOMY      THYROIDECTOMY         Immunization History:     There is no immunization history on file for this patient.    Active Problems:  Patient Active Problem List   Diagnosis Code    Cardiac dysrhythmia I49.9    Other and unspecified hyperlipidemia E78.5    Hearing loss H91.90    Diverticulitis of colon K57.32    Hemorrhoids K64.9    COPD (chronic obstructive pulmonary disease) ([facility]) J44.9    Pneumonia due to organism J18.9    Spinal stenosis, lumbar region, without neurogenic claudication M48.061    Mononeuritis G58.9    S/P appendectomy Z90.49    History of back surgery Z98.890    History of thyroidectomy, total E89.0    Edema R60.9    Acute on chronic respiratory failure ([facility]) J96.20       Isolation/Infection:   Isolation            No Isolation          Patient Infection Status       None to display                     Nurse Assessment:  Last Vital Signs: BP (!) 167/95   Pulse 82   Temp 98.1 °F (36.7 °C) (Oral)   Resp 18   Ht (S) 1.651 m (5' 5\")   Wt 79.6 kg (175 lb 7.8 oz)   SpO2 95%   BMI 29.20 kg/m²

## 2024-09-27 NOTE — PROGRESS NOTES
Clinical Pharmacy Note  Warfarin Consult    Amanda Smith is a 73 y.o. female receiving warfarin managed by pharmacy.      Warfarin Indication: Afib  Target INR range: 2-3   Dose prior to admission: 5mg all days except 7.5mg on Weds and Saturday    Current warfarin drug-drug interactions: ceftriaxone, doxycyline, escitalopram (on together prior to admit), methylprednisolone, trazodone (on together prior to admit)    Recent Labs     09/26/24  1307 09/26/24  1345 09/27/24  0529   HGB 12.2  --  11.2*   HCT 38.3  --  33.8*   INR  --  2.04* 2.10*       Assessment/Plan:    Continue with warfarin 5 mg tonight. Monitor for potential delayed effects of steroids increasing INR.    Daily PT/INR until stable within therapeutic range.     Thank you for the consult.  Will continue to follow.    Electronically signed by Mj Jenkins RPH on 9/27/2024 at 7:52 AM

## 2024-09-27 NOTE — CONSULTS
[facility]  Consultation Note    9/27/2024 8:50 AM    Chief Complaint: Worsening Shortness of Breath     Reason for Consultation: Vertebral Stenosis    History of Present Illness:  Patient is a 73 y.o. female who presented to the ED on 9/26/2024 with complaints of Worsening Shortness of Breath over the past few days. She has a significant PMH of A-fib (Coumadin for AC), HLD, Spinal Stenosis, and Lower Extremity Edema. The patient reports she has been feeling short of breath over the past few days and uses 3L of oxygen chronically. The patient tells us for the past 1-2 weeks, she has been experiencing lightheadedness when she changes positions. She has experienced this in the past but reports it seems to be occurring more frequently over the past couple of weeks. She confirms she takes Sotalol for her A-fib. CTA Head/Neck was performed in the ED d/t Headache/Dizziness which was notable for Left Subclavian Artery Stenosis of 40% and Left Vertebral Stenosis of 50%. We are consulted to evaluate the patient for Vertebral Stenosis. At present, she is seen resting in bed, she is alert and oriented, she is using 3L NC O2 and appears to be in stable condition.    Review of Systems  Review of Systems   Constitutional: Negative.    HENT: Negative.     Respiratory: Negative.     Cardiovascular: Negative.    Gastrointestinal: Negative.    Musculoskeletal: Negative.    Skin: Negative.    Neurological: Negative.    Psychiatric/Behavioral: Negative.          Past Medical History:   Diagnosis Date    Cardiac dysrhythmia, unspecified     Chronic airway obstruction, not elsewhere classified (HCC)     Diverticulitis of colon (without mention of hemorrhage)     Hemorrhoids without complication     Hyperlipemia     Leg edema     Mononeuritis of unspecified site     Pneumonia, organism unspecified     Spinal stenosis of lumbar region     Unspecified hearing loss        Past Surgical History:   Procedure

## 2024-09-27 NOTE — CARE COORDINATION
Case Management Assessment  Initial Evaluation    Date/Time of Evaluation: 9/27/2024 4:51 PM  Assessment Completed by: CLARKE Scott    If patient is discharged prior to next notation, then this note serves as note for discharge by case management.    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 Yes (Dr Powers  6-2024)    Chart Reviewed: Yes      History Provided by: Patient, Child/Family (son)  Patient Orientation: Alert and Oriented    Patient Cognition: Alert    Hospitalization in the last 30 days (Readmission):  No    If yes, Readmission Assessment in CM Navigator will be completed.    Advance Directives:      Code Status: Full Code   Patient's Primary Decision Maker is: Legal Next of Kin      Discharge Planning:    Patient lives with: Children Type of Home: House  Primary Care Giver: Self  Patient Support Systems include: Children (son)   Current Financial resources: Medicare  Current community resources: None  Current services prior to admission: Durable Medical Equipment            Current DME: Oxygen Therapy (Comment), Hospital Bed, Walker, Wheelchair, Cane, Shower Chair            Type of Home Care services:  PT, OT    ADLS  Prior functional level: Independent in ADLs/IADLs  Current functional level: Independent in ADLs/IADLs    PT AM-PAC: 18 /24  OT AM-PAC: 19 /24    Family can provide assistance at DC: Yes  Would  you like Case Management to discuss the discharge plan with any other family members/significant others, and if so, who? Yes (son)  Plans to Return to Present Housing: Yes  Other Identified Issues/Barriers to RETURNING to current housing: none  Potential Assistance needed at discharge: Home Care            Potential DME:    Patient expects to discharge to: House  Plan for transportation at discharge: Family    Financial    Payor: MEDICARE / Plan: MEDICARE PART A AND B / Product Type: *No Product type* /     Does insurance require precert for SNF: No    Potential assistance Purchasing Medications: No  Meds-to-Beds request: Yes      [id number] [place], [place] - [address] - P [phone] - F [phone] [address] [facility] [address]  Phone: [phone] Fax: [phone]    [facility] [id number] [facility] [address] [facility] [phone] - F [phone]  [address] [facility] [address]  Phone: [phone] Fax: [phone]      Notes:    Factors facilitating achievement of predicted outcomes: Family support, Motivated, Cooperative, Pleasant, Has needed Durable Medical Equipment at home, and Home is wheelchair accessible    Barriers to discharge: needs home care for strengthening    Additional Case Management Notes: Pt and son live together. She is independent in all her care needs.  Therapy recommends home care; list provided and she chose [facility].  Referral made to [facility] rep.  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

## 2024-09-27 NOTE — PROGRESS NOTES
V2.0    [facility] Progress Note      Name:  Amanda Smith /Age/Sex: 1950  (73 y.o. female)   MRN & CSN:  [id number] & [id number] Encounter Date/Time: 2024 11:57 AM EDT   Location:  [id number] PCP: Zackery Powers MD     Attending:Ciro Nieves DO       Hospital Day: 2    Assessment and Recommendations   Amanda Smith is a 73 y.o. female with pertinent PMHx of COPD, chronic hypoxic respiratory failure, atrial fibrillation/flutter, who presented complaining of worsening shortness of breath and lightheadedness.    Chronic hypoxic respiratory failure  COPD with acute exacerbation  -Briefly required BiPAP in the ED, has not required since admission  -Weaned down to home 3 L supplemental oxygen via nasal cannula  -Decrease IV steroids, IV Solu-Medrol 60 mg tonight, likely start oral prednisone tomorrow  -Pneumonia panel with growth of MSSA, will continue Rocephin and doxycycline for CAP coverage  -Singular 10 mg daily  -Continue Symbicort and Spiriva    Lightheadedness  -Suspect more likely to COPD exacerbation, but CTA did show 50% stenosis of left vertebral artery as well as 40% stenosis in proximal left subclavian artery  -Vascular surgery consulted, no intervention indicated  -Check orthostatic vitals    Atrial fibrillation  -Continue sotalol  -Continue anticoagulation with warfarin    Hypothyroidism  -Continue Synthroid      Diet ADULT DIET; Regular   DVT Prophylaxis [] Lovenox, []  Heparin, [] SCDs, [] Ambulation,  [] Eliquis, [] Xarelto  [x] Coumadin   Code Status Full Code   Disposition From: Home  Expected Disposition: Home  Estimated Date of Discharge:   Patient requires continued admission due to treatment for COPD exacerbation           Subjective:     Chief Complaint: Worsening shortness of breath    Patient was seen and examined today laying in bed  Labs reviewed, vitals reviewed, nursing notes reviewed  Says she is feeling a lot better, down to her baseline  subclavian artery. 4. No acute abnormality or flow-limiting stenosis of the major arteries of the head. 5. Bilateral mastoid effusions.     XR CHEST PORTABLE    Result Date: 9/26/2024  EXAMINATION: ONE XRAY VIEW OF THE CHEST 9/26/2024 10:54 am COMPARISON: 12/12/2011 HISTORY: ORDERING SYSTEM PROVIDED HISTORY: SOB TECHNOLOGIST PROVIDED HISTORY: Reason for exam:->SOB FINDINGS: The lungs appear clear.  The heart appears unremarkable.  Pacer leads are in good position.  There is a spinal stimulator in the midthoracic spine.  Bony structures are unremarkable.  Visualize upper abdomen appears normal.     No acute cardiopulmonary process.       CBC:   Recent Labs     09/26/24  1307 09/27/24  0529   WBC 5.3 4.6   HGB 12.2 11.2*    110*     BMP:    Recent Labs     09/26/24  1307 09/27/24  0529    142   K 4.2 3.8    102   CO2 40* 34*   BUN 9 9   CREATININE 0.6 <0.5*   GLUCOSE 169* 180*     Hepatic:   Recent Labs     09/26/24  1307 09/27/24  0529   AST 19 22   ALT 15 13   BILITOT 0.3 0.3   ALKPHOS 110 76     Lipids: No results found for: \"CHOL\", \"HDL\", \"TRIG\"  Hemoglobin A1C: No results found for: \"LABA1C\"  TSH: No results found for: \"TSH\"  Troponin: No results found for: \"TROPONINT\"  Lactic Acid: No results for input(s): \"LACTA\" in the last 72 hours.  BNP:   Recent Labs     09/26/24  1307   PROBNP 711*     UA:No results found for: \"NITRU\", \"COLORU\", \"PHUR\", \"LABCAST\", \"WBCUA\", \"RBCUA\", \"MUCUS\", \"TRICHOMONAS\", \"YEAST\", \"BACTERIA\", \"CLARITYU\", \"SPECGRAV\", \"LEUKOCYTESUR\", \"UROBILINOGEN\", \"BILIRUBINUR\", \"BLOODU\", \"GLUCOSEU\", \"KETUA\", \"AMORPHOUS\"  Urine Cultures: No results found for: \"LABURIN\"  Blood Cultures: No results found for: \"BC\"  No results found for: \"BLOODCULT2\"  Organism: No results found for: \"ORG\"      Electronically signed by Ciro Nieves DO on 9/27/2024 at 11:57 AM

## 2024-09-27 NOTE — PROGRESS NOTES
Physical Therapy  Facility/Department: [address] PROGRESSIVE CARE  Physical Therapy Initial Assessment    Name: Amanda Smith  : 1950  MRN: [id number]  Date of Service: 2024    Discharge Recommendations:  Patient would benefit from continued therapy after discharge, Home with Home health PT          Patient Diagnosis(es): The primary encounter diagnosis was Acute on chronic respiratory failure with hypercapnia. Diagnoses of Acute exacerbation of chronic obstructive pulmonary disease (HCC), Lightheadedness, Stenosis of left vertebral artery, and Stenosis of left subclavian vein were also pertinent to this visit.  Past Medical History:  has a past medical history of Cardiac dysrhythmia, unspecified, Chronic airway obstruction, not elsewhere classified (HCC), Diverticulitis of colon (without mention of hemorrhage), Hemorrhoids without complication, Hyperlipemia, Leg edema, Mononeuritis of unspecified site, Pneumonia, organism unspecified, Spinal stenosis of lumbar region, and Unspecified hearing loss.  Past Surgical History:  has a past surgical history that includes Appendectomy; back surgery; Colon surgery; colostomy; Thyroidectomy; and colectomy.    Assessment  Body Structures, Functions, Activity Limitations Requiring Skilled Therapeutic Intervention: Decreased functional mobility ;Decreased endurance;Decreased balance;Decreased ADL status  Assessment: Pt is a 73 y.o. F. admitted  for COPD exacerbation, respiratory failure.  She presents pleasant and agreeable to evaluation, c/o fatigue, but able to complete mobility tasks with RW, CGA-SBA.  Standing tolerance limited d/t dizziness and SOB, and she would benefit from continued therapy to maximize her endurance.  Pt requires additional therapy in the acute setting to improve strength, balance, endurance, and independence with mobility tasks.  Pt would benefit from HHPT upon D/C to continue to address these deficits.     Amanda Smith

## 2024-09-27 NOTE — PROGRESS NOTES
Clinical Pharmacy Note  Warfarin Consult    Amanda Smith is a 73 y.o. female receiving warfarin managed by pharmacy.      Warfarin Indication: Afib  Target INR range: 2-3   Dose prior to admission: 5mg all days except 7.5mg on wed and fri    Current warfarin drug-drug interactions: ceftriaxone, doxycyline, escitalopram (on together prior to admit), methylprednisolone, trazodone (on together prior to admit)    Recent Labs     09/26/24  1307 09/26/24  1345   HGB 12.2  --    HCT 38.3  --    INR  --  2.04*       Assessment/Plan:    Warfarin 5 mg tonight. Daily PT/INR until stable within therapeutic range.     Thank you for the consult.  Will continue to follow.    Brandi Daniel, PharmD

## 2024-09-27 NOTE — PLAN OF CARE
Problem: Discharge Planning  Goal: Discharge to home or other facility with appropriate resources  9/27/2024 1035 by Mila Vidales, RN  Outcome: Progressing     Problem: Pain  Goal: Verbalizes/displays adequate comfort level or baseline comfort level  9/27/2024 1035 by Mila Vidales, RN  Outcome: Progressing     Problem: Safety - Adult  Goal: Free from fall injury  9/27/2024 1035 by Mila Vidales, RN  Outcome: Progressing     Problem: ABCDS Injury Assessment  Goal: Absence of physical injury  9/27/2024 1035 by Mila Vidales, RN  Outcome: Progressing     Problem: Chronic Conditions and Co-morbidities  Goal: Patient's chronic conditions and co-morbidity symptoms are monitored and maintained or improved  Outcome: Progressing

## 2024-09-27 NOTE — PROGRESS NOTES
Occupational Therapy  Facility/Department: [address] PROGRESSIVE CARE  Occupational Therapy Initial Assessment    Name: Amanda Smith  : 1950  MRN: [id number]  Date of Service: 2024    Discharge Recommendations:  Home with assist PRN, Continue to assess pending progress  OT Equipment Recommendations  Equipment Needed: No  Amanda Smith scored a 19/24 on the AM-PAC ADL Inpatient form.  At this time, no further OT is recommended upon discharge due to support at home.  Recommend patient returns to prior setting with prior services.        Patient Diagnosis(es): The primary encounter diagnosis was Acute on chronic respiratory failure with hypercapnia. Diagnoses of Acute exacerbation of chronic obstructive pulmonary disease (HCC), Lightheadedness, Stenosis of left vertebral artery, and Stenosis of left subclavian vein were also pertinent to this visit.  Past Medical History:  has a past medical history of Cardiac dysrhythmia, unspecified, Chronic airway obstruction, not elsewhere classified (HCC), Diverticulitis of colon (without mention of hemorrhage), Hemorrhoids without complication, Hyperlipemia, Leg edema, Mononeuritis of unspecified site, Pneumonia, organism unspecified, Spinal stenosis of lumbar region, and Unspecified hearing loss.  Past Surgical History:  has a past surgical history that includes Appendectomy; back surgery; Colon surgery; colostomy; Thyroidectomy; and colectomy.    Treatment Diagnosis: decreased fxl transfers/mob and ADL status      Assessment  Performance deficits / Impairments: Decreased functional mobility ;Decreased ADL status;Decreased endurance;Decreased balance  Assessment: Pt is a 73 y.o. F admitted for acute chronic respiratory failure. At home, she is on 3L O2. PTA, she reported being Ind w/ fxl mob and self care. Intermittently uses RW at home. Today- she is functioning below her baseline as she required SBA for bed mob, SBA-CGA fxl transfers, CGA fxl mob w/o

## 2024-09-28 VITALS
WEIGHT: 176.37 LBS | DIASTOLIC BLOOD PRESSURE: 64 MMHG | SYSTOLIC BLOOD PRESSURE: 123 MMHG | OXYGEN SATURATION: 95 % | HEIGHT: 65 IN | HEART RATE: 70 BPM | RESPIRATION RATE: 16 BRPM | BODY MASS INDEX: 29.38 KG/M2 | TEMPERATURE: 98 F

## 2024-09-28 LAB
ANION GAP SERPL CALCULATED.3IONS-SCNC: 4 MMOL/L (ref 3–16)
BUN SERPL-MCNC: 13 MG/DL (ref 7–20)
CALCIUM SERPL-MCNC: 8.4 MG/DL (ref 8.3–10.6)
CHLORIDE SERPL-SCNC: 100 MMOL/L (ref 99–110)
CO2 SERPL-SCNC: 35 MMOL/L (ref 21–32)
CREAT SERPL-MCNC: <0.5 MG/DL (ref 0.6–1.2)
GFR SERPLBLD CREATININE-BSD FMLA CKD-EPI: >90 ML/MIN/{1.73_M2}
GLUCOSE SERPL-MCNC: 166 MG/DL (ref 70–99)
INR PPP: 2.52 (ref 0.85–1.15)
MRSA SPEC QL CULT: NORMAL
POTASSIUM SERPL-SCNC: 3.9 MMOL/L (ref 3.5–5.1)
PROTHROMBIN TIME: 27.2 SEC (ref 11.9–14.9)
SODIUM SERPL-SCNC: 139 MMOL/L (ref 136–145)

## 2024-09-28 PROCEDURE — 80048 BASIC METABOLIC PNL TOTAL CA: CPT

## 2024-09-28 PROCEDURE — 6370000000 HC RX 637 (ALT 250 FOR IP): Performed by: STUDENT IN AN ORGANIZED HEALTH CARE EDUCATION/TRAINING PROGRAM

## 2024-09-28 PROCEDURE — 36415 COLL VENOUS BLD VENIPUNCTURE: CPT

## 2024-09-28 PROCEDURE — 6360000002 HC RX W HCPCS: Performed by: STUDENT IN AN ORGANIZED HEALTH CARE EDUCATION/TRAINING PROGRAM

## 2024-09-28 PROCEDURE — 6370000000 HC RX 637 (ALT 250 FOR IP): Performed by: INTERNAL MEDICINE

## 2024-09-28 PROCEDURE — 6370000000 HC RX 637 (ALT 250 FOR IP): Performed by: NURSE PRACTITIONER

## 2024-09-28 PROCEDURE — 2700000000 HC OXYGEN THERAPY PER DAY

## 2024-09-28 PROCEDURE — 85610 PROTHROMBIN TIME: CPT

## 2024-09-28 PROCEDURE — 2580000003 HC RX 258: Performed by: INTERNAL MEDICINE

## 2024-09-28 PROCEDURE — 94640 AIRWAY INHALATION TREATMENT: CPT

## 2024-09-28 RX ORDER — WARFARIN SODIUM 2.5 MG/1
2.5 TABLET ORAL ONCE
Status: COMPLETED | OUTPATIENT
Start: 2024-09-28 | End: 2024-09-28

## 2024-09-28 RX ORDER — DOXYCYCLINE HYCLATE 100 MG
100 TABLET ORAL EVERY 12 HOURS SCHEDULED
Qty: 8 TABLET | Refills: 0 | Status: SHIPPED | OUTPATIENT
Start: 2024-09-28 | End: 2024-10-02

## 2024-09-28 RX ORDER — PREDNISONE 20 MG/1
40 TABLET ORAL DAILY
Qty: 8 TABLET | Refills: 0 | Status: SHIPPED | OUTPATIENT
Start: 2024-09-29 | End: 2024-10-03

## 2024-09-28 RX ADMIN — DOXYCYCLINE HYCLATE 100 MG: 100 TABLET, COATED ORAL at 09:40

## 2024-09-28 RX ADMIN — BUDESONIDE AND FORMOTEROL FUMARATE DIHYDRATE 2 PUFF: 160; 4.5 AEROSOL RESPIRATORY (INHALATION) at 12:39

## 2024-09-28 RX ADMIN — BUSPIRONE HYDROCHLORIDE 5 MG: 5 TABLET ORAL at 09:40

## 2024-09-28 RX ADMIN — BUSPIRONE HYDROCHLORIDE 5 MG: 5 TABLET ORAL at 12:16

## 2024-09-28 RX ADMIN — TIOTROPIUM BROMIDE INHALATION SPRAY 2 PUFF: 3.12 SPRAY, METERED RESPIRATORY (INHALATION) at 12:39

## 2024-09-28 RX ADMIN — WARFARIN SODIUM 2.5 MG: 2.5 TABLET ORAL at 12:16

## 2024-09-28 RX ADMIN — PREDNISONE 40 MG: 20 TABLET ORAL at 09:40

## 2024-09-28 RX ADMIN — GUAIFENESIN 600 MG: 600 TABLET ORAL at 09:40

## 2024-09-28 RX ADMIN — SOTALOL HYDROCHLORIDE 80 MG: 80 TABLET ORAL at 09:40

## 2024-09-28 RX ADMIN — SODIUM CHLORIDE, PRESERVATIVE FREE 10 ML: 5 INJECTION INTRAVENOUS at 09:45

## 2024-09-28 RX ADMIN — KETOROLAC TROMETHAMINE 15 MG: 15 INJECTION, SOLUTION INTRAMUSCULAR; INTRAVENOUS at 05:03

## 2024-09-28 RX ADMIN — LEVOTHYROXINE SODIUM 50 MCG: 0.05 TABLET ORAL at 05:05

## 2024-09-28 RX ADMIN — ESCITALOPRAM OXALATE 10 MG: 10 TABLET ORAL at 09:40

## 2024-09-28 RX ADMIN — MONTELUKAST 10 MG: 10 TABLET, FILM COATED ORAL at 09:40

## 2024-09-28 ASSESSMENT — PAIN SCALES - GENERAL
PAINLEVEL_OUTOF10: 0
PAINLEVEL_OUTOF10: 9

## 2024-09-28 ASSESSMENT — PAIN DESCRIPTION - ORIENTATION: ORIENTATION: RIGHT;LEFT

## 2024-09-28 ASSESSMENT — PAIN DESCRIPTION - FREQUENCY: FREQUENCY: CONTINUOUS

## 2024-09-28 ASSESSMENT — PAIN DESCRIPTION - DESCRIPTORS: DESCRIPTORS: POUNDING

## 2024-09-28 ASSESSMENT — PAIN - FUNCTIONAL ASSESSMENT: PAIN_FUNCTIONAL_ASSESSMENT: PREVENTS OR INTERFERES SOME ACTIVE ACTIVITIES AND ADLS

## 2024-09-28 ASSESSMENT — PAIN DESCRIPTION - ONSET: ONSET: ON-GOING

## 2024-09-28 ASSESSMENT — PAIN DESCRIPTION - PAIN TYPE: TYPE: CHRONIC PAIN

## 2024-09-28 NOTE — PLAN OF CARE
Pain/discomfort being managed with PRN analgesics per MD orders. Pt able to express presence and absence of pain and rate pain appropriately using numerical scale.  Fall risk assessment completed every shift. All precautions in place. Pt has call light within reach at all times. Room clear of clutter. Pt aware to call for assistance when getting up.   Skin assessment completed every shift. Pt assessed for incontinence, appropriate barrier cream applied prn.  Pt encouraged to turn/rotate every 2 hours. Assistance provided if pt unable to do so themselves.      Intake/Output Summary (Last 24 hours) at 9/28/2024 0721  Last data filed at 9/27/2024 2201  Gross per 24 hour   Intake 360 ml   Output --   Net 360 ml   \  Vitals:    09/28/24 0718   BP: 123/64   Pulse: 75   Resp: 16   Temp: 98 °F (36.7 °C)   SpO2: 98%     Patient assessed for fall risk; fall precautions initiated. Patient and family instructed about safety devices. Environment kept free of clutter and adequate lighting provided.  Bed locked and in lowest position.  Call light within reach. Will continue to monitor.     done

## 2024-09-28 NOTE — DISCHARGE SUMMARY
SYSTEM PROVIDED HISTORY: headache, dizziness TECHNOLOGIST PROVIDED HISTORY: Has a \"code stroke\" or \"stroke alert\" been called?->No Reason for exam:->headache, dizziness Decision Support Exception - unselect if not a suspected or confirmed emergency medical condition->Emergency Medical Condition (MA) Reason for Exam: headache, dizziness FINDINGS: CT HEAD: BRAIN/VENTRICLES:   There is periventricular white matter low attenuation, likely related to mild chronic microvascular disease.  There is no acute intracranial hemorrhage, mass effect or midline shift.  No abnormal extra-axial fluid collection.  The gray-white differentiation is maintained without evidence of an acute infarct.  There is no hydrocephalus. ORBITS: The visualized portion of the orbits demonstrate no acute abnormality. SINUSES: There are retention cysts versus polyps in the bilateral maxillary sinuses.  The remainder of the paranasal sinuses are clear.  There is opacification of the bilateral mastoid air cells. SOFT TISSUES/SKULL:  No acute abnormality of the visualized skull or soft tissues. CTA NECK: AORTIC ARCH/ARCH VESSELS: No dissection or arterial injury.  There is 40% stenosis in the proximal left subclavian artery.  No significant stenosis of the brachiocephalic or right subclavian arteries. CAROTID ARTERIES: No dissection, arterial injury, or hemodynamically significant stenosis by NASCET criteria. VERTEBRAL ARTERIES: There is 50% stenosis at the origin of the left vertebral artery.  No dissection, arterial injury, or significant stenosis in the remainder of the bilateral vertebral arteries. SOFT TISSUES: The lung apices are clear.  No cervical or superior mediastinal lymphadenopathy.  The larynx and pharynx are unremarkable.  No acute abnormality of the salivary and thyroid glands. BONES: No acute osseous abnormality. CTA HEAD: ANTERIOR CIRCULATION: No significant stenosis of the intracranial internal carotid, anterior cerebral, or middle

## 2024-09-28 NOTE — PLAN OF CARE
Problem: Discharge Planning  Goal: Discharge to home or other facility with appropriate resources  Outcome: Progressing  Flowsheets (Taken 9/27/2024 2020)  Discharge to home or other facility with appropriate resources:   Identify barriers to discharge with patient and caregiver   Arrange for needed discharge resources and transportation as appropriate   Identify discharge learning needs (meds, wound care, etc)     Problem: Pain  Goal: Verbalizes/displays adequate comfort level or baseline comfort level  Outcome: Progressing  Flowsheets (Taken 9/27/2024 2316)  Verbalizes/displays adequate comfort level or baseline comfort level:   Encourage patient to monitor pain and request assistance   Assess pain using appropriate pain scale   Administer analgesics based on type and severity of pain and evaluate response   Implement non-pharmacological measures as appropriate and evaluate response     Problem: Safety - Adult  Goal: Free from fall injury  Outcome: Progressing  Flowsheets (Taken 9/28/2024 0111)  Free From Fall Injury: Instruct family/caregiver on patient safety     Problem: ABCDS Injury Assessment  Goal: Absence of physical injury  Outcome: Progressing  Flowsheets (Taken 9/28/2024 0111)  Absence of Physical Injury: Implement safety measures based on patient assessment     Problem: Chronic Conditions and Co-morbidities  Goal: Patient's chronic conditions and co-morbidity symptoms are monitored and maintained or improved  Outcome: Progressing  Flowsheets (Taken 9/27/2024 2020)  Care Plan - Patient's Chronic Conditions and Co-Morbidity Symptoms are Monitored and Maintained or Improved: Monitor and assess patient's chronic conditions and comorbid symptoms for stability, deterioration, or improvement     Problem: Respiratory - Adult  Goal: Achieves optimal ventilation and oxygenation  Outcome: Progressing  Flowsheets (Taken 9/28/2024 0117)  Achieves optimal ventilation and oxygenation:   Assess for changes in

## 2024-09-28 NOTE — PROGRESS NOTES
Clinical Pharmacy Note  Warfarin Consult    Amanda Smith is a 73 y.o. female receiving warfarin managed by pharmacy.      Warfarin Indication: Afib  Target INR range: 2-3   Dose prior to admission: 5mg all days except 7.5mg on Weds and Saturday    Current warfarin drug-drug interactions: ceftriaxone, doxycyline, escitalopram (on together prior to admit), prednisone, trazodone (on together prior to admit)    Recent Labs     09/26/24  1307 09/26/24  1345 09/27/24  0529 09/28/24  0438   HGB 12.2  --  11.2*  --    HCT 38.3  --  33.8*  --    INR  --  2.04* 2.10* 2.52*       Assessment/Plan:    Continue with warfarin 2.5 mg tonight. Monitor for potential delayed effects of steroids increasing INR.    Daily PT/INR until stable within therapeutic range.     Thank you for the consult.  Will continue to follow.    Electronically signed by Alonso Blackburn RPH on 9/28/2024 at 12:04 PM

## 2024-09-28 NOTE — CARE COORDINATION
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 [address] - P [phone] - F [phone]  [address] [facility] [address]  Phone: [phone] Fax: [phone]    [facility] [id number] [facility] [address] [facility] [phone] -  [phone]  [address] [facility] [address]  Phone: [phone] Fax: [phone]      Assistance purchasing medications?: Potential Assistance Purchasing Medications: No  Assistance provided by Case Management: None at this time    DISCHARGE Disposition: Home with Home Health Care    Home Care:  Home Care ordered at discharge: Yes  Home Care Agency: [facility]  Phone: [phone]  Fax: [phone]  Orders faxed: Yes    Transportation:  Transportation PLAN for discharge: family   Mode of Transport: Private Car  Time of Transport: later today    Transport form completed: Not Indicated    IMM Completed:   Given on admit    Additional CM Notes: Patient will return home and resume [facility] ([facility]). Informed Annalisa with [facility] of dc and home care orders, she will pull orders via [employer].    The Plan for Transition of Care is related to the following treatment goals of Edema [R60.9]  Lightheadedness [R42]  Acute exacerbation of chronic obstructive pulmonary disease (HCC) [J44.1]  Stenosis of left

## 2024-09-28 NOTE — PROGRESS NOTES
Discharge instructions reviewed with pt/family, discussed medications, VU, denies any further questions or anxiety related to discharge. Educational handouts in regards to new medications, given via [employer], side effects discussed, VU. IV/tele discontinued. Pt discharged to home with son. Taken from room via  by transport , personal belongings taken with.

## 2024-09-30 LAB
BACTERIA BLD CULT ORG #2: NORMAL
BACTERIA BLD CULT: NORMAL

## 2024-10-27 ENCOUNTER — HOSPITAL ENCOUNTER (INPATIENT)
Age: 74
LOS: 5 days | Discharge: HOME HEALTH | End: 2024-11-01
Attending: EMERGENCY MEDICINE | Admitting: HOSPITALIST
Payer: MEDICARE

## 2024-10-27 ENCOUNTER — APPOINTMENT (OUTPATIENT)
Dept: GENERAL RADIOLOGY | Age: 74
End: 2024-10-27
Payer: MEDICARE

## 2024-10-27 DIAGNOSIS — J96.22 ACUTE ON CHRONIC RESPIRATORY FAILURE WITH HYPOXIA AND HYPERCAPNIA: ICD-10-CM

## 2024-10-27 DIAGNOSIS — R06.02 SHORTNESS OF BREATH: ICD-10-CM

## 2024-10-27 DIAGNOSIS — R79.89 ELEVATED TROPONIN: ICD-10-CM

## 2024-10-27 DIAGNOSIS — J96.21 ACUTE ON CHRONIC RESPIRATORY FAILURE WITH HYPOXIA AND HYPERCAPNIA: ICD-10-CM

## 2024-10-27 DIAGNOSIS — R79.1 SUBTHERAPEUTIC INTERNATIONAL NORMALIZED RATIO (INR): ICD-10-CM

## 2024-10-27 DIAGNOSIS — J44.1 COPD EXACERBATION (HCC): Primary | ICD-10-CM

## 2024-10-27 LAB
ALBUMIN SERPL-MCNC: 3.3 G/DL (ref 3.4–5)
ALBUMIN/GLOB SERPL: 1.4 {RATIO} (ref 1.1–2.2)
ALP SERPL-CCNC: 79 U/L (ref 40–129)
ALT SERPL-CCNC: 14 U/L (ref 10–40)
ANION GAP SERPL CALCULATED.3IONS-SCNC: 6 MMOL/L (ref 3–16)
AST SERPL-CCNC: 20 U/L (ref 15–37)
BASE EXCESS BLDV CALC-SCNC: 14 MMOL/L
BASE EXCESS BLDV CALC-SCNC: 14.6 MMOL/L
BASE EXCESS BLDV CALC-SCNC: 14.7 MMOL/L
BASOPHILS # BLD: 0 K/UL (ref 0–0.2)
BASOPHILS NFR BLD: 0.4 %
BILIRUB SERPL-MCNC: 0.4 MG/DL (ref 0–1)
BUN SERPL-MCNC: 12 MG/DL (ref 7–20)
CALCIUM SERPL-MCNC: 8.5 MG/DL (ref 8.3–10.6)
CHLORIDE SERPL-SCNC: 97 MMOL/L (ref 99–110)
CO2 BLDV-SCNC: 45 MMOL/L
CO2 BLDV-SCNC: 48 MMOL/L
CO2 BLDV-SCNC: 48 MMOL/L
CO2 SERPL-SCNC: 40 MMOL/L (ref 21–32)
COHGB MFR BLDV: 3.6 %
COHGB MFR BLDV: 4.2 %
COHGB MFR BLDV: 4.5 %
CREAT SERPL-MCNC: 0.4 MG/DL (ref 0.6–1.2)
DEPRECATED RDW RBC AUTO: 14.4 % (ref 12.4–15.4)
EOSINOPHIL # BLD: 0.1 K/UL (ref 0–0.6)
EOSINOPHIL NFR BLD: 1.3 %
FLUAV RNA UPPER RESP QL NAA+PROBE: NEGATIVE
FLUBV AG NPH QL: NEGATIVE
GFR SERPLBLD CREATININE-BSD FMLA CKD-EPI: >90 ML/MIN/{1.73_M2}
GLUCOSE SERPL-MCNC: 152 MG/DL (ref 70–99)
HCO3 BLDV-SCNC: 43 MMOL/L (ref 23–29)
HCO3 BLDV-SCNC: 45 MMOL/L (ref 23–29)
HCO3 BLDV-SCNC: 45 MMOL/L (ref 23–29)
HCT VFR BLD AUTO: 36.5 % (ref 36–48)
HGB BLD-MCNC: 12.1 G/DL (ref 12–16)
INR PPP: 1.68 (ref 0.85–1.15)
LYMPHOCYTES # BLD: 1.5 K/UL (ref 1–5.1)
LYMPHOCYTES NFR BLD: 23.5 %
MAGNESIUM SERPL-MCNC: 1.9 MG/DL (ref 1.8–2.4)
MCH RBC QN AUTO: 34.4 PG (ref 26–34)
MCHC RBC AUTO-ENTMCNC: 33.1 G/DL (ref 31–36)
MCV RBC AUTO: 103.9 FL (ref 80–100)
METHGB MFR BLDV: 0.5 %
METHGB MFR BLDV: 0.6 %
METHGB MFR BLDV: 0.7 %
MONOCYTES # BLD: 0.5 K/UL (ref 0–1.3)
MONOCYTES NFR BLD: 7.8 %
NEUTROPHILS # BLD: 4.3 K/UL (ref 1.7–7.7)
NEUTROPHILS NFR BLD: 67 %
NT-PROBNP SERPL-MCNC: 371 PG/ML (ref 0–124)
O2 THERAPY: ABNORMAL
PCO2 BLDV: 78.5 MMHG (ref 40–50)
PCO2 BLDV: 90.2 MMHG (ref 40–50)
PCO2 BLDV: 94.9 MMHG (ref 40–50)
PH BLDV: 7.29 [PH] (ref 7.35–7.45)
PH BLDV: 7.3 [PH] (ref 7.35–7.45)
PH BLDV: 7.35 [PH] (ref 7.35–7.45)
PLATELET # BLD AUTO: 124 K/UL (ref 135–450)
PMV BLD AUTO: 10.3 FL (ref 5–10.5)
PO2 BLDV: 44 MMHG
PO2 BLDV: 50 MMHG
PO2 BLDV: 66 MMHG
POTASSIUM SERPL-SCNC: 3.4 MMOL/L (ref 3.5–5.1)
PROT SERPL-MCNC: 5.7 G/DL (ref 6.4–8.2)
PROTHROMBIN TIME: 19.9 SEC (ref 11.9–14.9)
RBC # BLD AUTO: 3.51 M/UL (ref 4–5.2)
SAO2 % BLDV: 80 %
SAO2 % BLDV: 83 %
SAO2 % BLDV: 93 %
SARS-COV-2 RDRP RESP QL NAA+PROBE: NOT DETECTED
SODIUM SERPL-SCNC: 143 MMOL/L (ref 136–145)
TROPONIN, HIGH SENSITIVITY: 16 NG/L (ref 0–14)
TROPONIN, HIGH SENSITIVITY: 22 NG/L (ref 0–14)
WBC # BLD AUTO: 6.3 K/UL (ref 4–11)

## 2024-10-27 PROCEDURE — 71045 X-RAY EXAM CHEST 1 VIEW: CPT

## 2024-10-27 PROCEDURE — 94761 N-INVAS EAR/PLS OXIMETRY MLT: CPT

## 2024-10-27 PROCEDURE — 83735 ASSAY OF MAGNESIUM: CPT

## 2024-10-27 PROCEDURE — 6360000002 HC RX W HCPCS: Performed by: EMERGENCY MEDICINE

## 2024-10-27 PROCEDURE — 87502 INFLUENZA DNA AMP PROBE: CPT

## 2024-10-27 PROCEDURE — 87804 INFLUENZA ASSAY W/OPTIC: CPT

## 2024-10-27 PROCEDURE — 99285 EMERGENCY DEPT VISIT HI MDM: CPT

## 2024-10-27 PROCEDURE — 2580000003 HC RX 258

## 2024-10-27 PROCEDURE — 2700000000 HC OXYGEN THERAPY PER DAY

## 2024-10-27 PROCEDURE — 93005 ELECTROCARDIOGRAM TRACING: CPT | Performed by: EMERGENCY MEDICINE

## 2024-10-27 PROCEDURE — 84484 ASSAY OF TROPONIN QUANT: CPT

## 2024-10-27 PROCEDURE — 94640 AIRWAY INHALATION TREATMENT: CPT

## 2024-10-27 PROCEDURE — 2000000000 HC ICU R&B

## 2024-10-27 PROCEDURE — 85025 COMPLETE CBC W/AUTO DIFF WBC: CPT

## 2024-10-27 PROCEDURE — 6370000000 HC RX 637 (ALT 250 FOR IP): Performed by: EMERGENCY MEDICINE

## 2024-10-27 PROCEDURE — 82803 BLOOD GASES ANY COMBINATION: CPT

## 2024-10-27 PROCEDURE — 2580000003 HC RX 258: Performed by: EMERGENCY MEDICINE

## 2024-10-27 PROCEDURE — 83880 ASSAY OF NATRIURETIC PEPTIDE: CPT

## 2024-10-27 PROCEDURE — 94660 CPAP INITIATION&MGMT: CPT

## 2024-10-27 PROCEDURE — 6360000002 HC RX W HCPCS: Performed by: HOSPITALIST

## 2024-10-27 PROCEDURE — 80053 COMPREHEN METABOLIC PANEL: CPT

## 2024-10-27 PROCEDURE — 36415 COLL VENOUS BLD VENIPUNCTURE: CPT

## 2024-10-27 PROCEDURE — 2580000003 HC RX 258: Performed by: HOSPITALIST

## 2024-10-27 PROCEDURE — 96374 THER/PROPH/DIAG INJ IV PUSH: CPT

## 2024-10-27 PROCEDURE — 85610 PROTHROMBIN TIME: CPT

## 2024-10-27 PROCEDURE — 87635 SARS-COV-2 COVID-19 AMP PRB: CPT

## 2024-10-27 PROCEDURE — 5A09457 ASSISTANCE WITH RESPIRATORY VENTILATION, 24-96 CONSECUTIVE HOURS, CONTINUOUS POSITIVE AIRWAY PRESSURE: ICD-10-PCS | Performed by: HOSPITALIST

## 2024-10-27 RX ORDER — ALBUTEROL SULFATE 90 UG/1
2 INHALANT RESPIRATORY (INHALATION) EVERY 6 HOURS PRN
Status: DISCONTINUED | OUTPATIENT
Start: 2024-10-27 | End: 2024-11-01 | Stop reason: HOSPADM

## 2024-10-27 RX ORDER — ESCITALOPRAM OXALATE 10 MG/1
10 TABLET ORAL DAILY
Status: DISCONTINUED | OUTPATIENT
Start: 2024-10-28 | End: 2024-11-01 | Stop reason: HOSPADM

## 2024-10-27 RX ORDER — ONDANSETRON 4 MG/1
4 TABLET, ORALLY DISINTEGRATING ORAL EVERY 8 HOURS PRN
Status: DISCONTINUED | OUTPATIENT
Start: 2024-10-27 | End: 2024-11-01 | Stop reason: HOSPADM

## 2024-10-27 RX ORDER — METHYLPREDNISOLONE SODIUM SUCCINATE 125 MG/2ML
60 INJECTION, POWDER, LYOPHILIZED, FOR SOLUTION INTRAMUSCULAR; INTRAVENOUS EVERY 8 HOURS
Status: DISCONTINUED | OUTPATIENT
Start: 2024-10-27 | End: 2024-10-28

## 2024-10-27 RX ORDER — FUROSEMIDE 20 MG/1
20 TABLET ORAL DAILY PRN
Status: DISCONTINUED | OUTPATIENT
Start: 2024-10-27 | End: 2024-11-01 | Stop reason: HOSPADM

## 2024-10-27 RX ORDER — ROPINIROLE 1 MG/1
1 TABLET, FILM COATED ORAL NIGHTLY
Status: DISCONTINUED | OUTPATIENT
Start: 2024-10-27 | End: 2024-11-01 | Stop reason: HOSPADM

## 2024-10-27 RX ORDER — PROMETHAZINE HYDROCHLORIDE 25 MG/1
25 SUPPOSITORY RECTAL EVERY 6 HOURS PRN
Status: DISCONTINUED | OUTPATIENT
Start: 2024-10-27 | End: 2024-11-01 | Stop reason: HOSPADM

## 2024-10-27 RX ORDER — ACETAMINOPHEN 325 MG/1
650 TABLET ORAL EVERY 6 HOURS PRN
Status: DISCONTINUED | OUTPATIENT
Start: 2024-10-27 | End: 2024-11-01 | Stop reason: HOSPADM

## 2024-10-27 RX ORDER — LEVOTHYROXINE SODIUM 50 UG/1
50 TABLET ORAL DAILY
Status: DISCONTINUED | OUTPATIENT
Start: 2024-10-28 | End: 2024-11-01 | Stop reason: HOSPADM

## 2024-10-27 RX ORDER — WARFARIN SODIUM 2.5 MG/1
2.5 TABLET ORAL
Status: ACTIVE | OUTPATIENT
Start: 2024-10-27 | End: 2024-10-28

## 2024-10-27 RX ORDER — ASPIRIN 81 MG/1
324 TABLET, CHEWABLE ORAL ONCE
Status: COMPLETED | OUTPATIENT
Start: 2024-10-27 | End: 2024-10-27

## 2024-10-27 RX ORDER — TRAZODONE HYDROCHLORIDE 100 MG/1
100 TABLET ORAL NIGHTLY
Status: DISCONTINUED | OUTPATIENT
Start: 2024-10-27 | End: 2024-11-01 | Stop reason: HOSPADM

## 2024-10-27 RX ORDER — IPRATROPIUM BROMIDE AND ALBUTEROL SULFATE 2.5; .5 MG/3ML; MG/3ML
1 SOLUTION RESPIRATORY (INHALATION)
Status: DISCONTINUED | OUTPATIENT
Start: 2024-10-28 | End: 2024-11-01 | Stop reason: HOSPADM

## 2024-10-27 RX ORDER — POLYETHYLENE GLYCOL 3350 17 G/17G
17 POWDER, FOR SOLUTION ORAL DAILY PRN
Status: DISCONTINUED | OUTPATIENT
Start: 2024-10-27 | End: 2024-11-01 | Stop reason: HOSPADM

## 2024-10-27 RX ORDER — MONTELUKAST SODIUM 10 MG/1
10 TABLET ORAL DAILY
Status: DISCONTINUED | OUTPATIENT
Start: 2024-10-28 | End: 2024-11-01 | Stop reason: HOSPADM

## 2024-10-27 RX ORDER — SOTALOL HYDROCHLORIDE 80 MG/1
80 TABLET ORAL 2 TIMES DAILY
Status: DISCONTINUED | OUTPATIENT
Start: 2024-10-27 | End: 2024-11-01 | Stop reason: HOSPADM

## 2024-10-27 RX ORDER — WATER 10 ML/10ML
INJECTION INTRAMUSCULAR; INTRAVENOUS; SUBCUTANEOUS
Status: COMPLETED
Start: 2024-10-27 | End: 2024-10-27

## 2024-10-27 RX ORDER — NICOTINE 21 MG/24HR
1 PATCH, TRANSDERMAL 24 HOURS TRANSDERMAL DAILY
Status: DISCONTINUED | OUTPATIENT
Start: 2024-10-28 | End: 2024-11-01 | Stop reason: HOSPADM

## 2024-10-27 RX ORDER — ATORVASTATIN CALCIUM 80 MG/1
80 TABLET, FILM COATED ORAL
Status: DISCONTINUED | OUTPATIENT
Start: 2024-10-27 | End: 2024-11-01 | Stop reason: HOSPADM

## 2024-10-27 RX ORDER — ALBUTEROL SULFATE 0.83 MG/ML
2.5 SOLUTION RESPIRATORY (INHALATION) ONCE
Status: COMPLETED | OUTPATIENT
Start: 2024-10-27 | End: 2024-10-27

## 2024-10-27 RX ORDER — MAGNESIUM SULFATE IN WATER 40 MG/ML
2000 INJECTION, SOLUTION INTRAVENOUS PRN
Status: DISCONTINUED | OUTPATIENT
Start: 2024-10-27 | End: 2024-11-01 | Stop reason: HOSPADM

## 2024-10-27 RX ORDER — SODIUM CHLORIDE 9 MG/ML
INJECTION, SOLUTION INTRAVENOUS PRN
Status: DISCONTINUED | OUTPATIENT
Start: 2024-10-27 | End: 2024-11-01 | Stop reason: HOSPADM

## 2024-10-27 RX ORDER — SODIUM CHLORIDE 0.9 % (FLUSH) 0.9 %
5-40 SYRINGE (ML) INJECTION EVERY 12 HOURS SCHEDULED
Status: DISCONTINUED | OUTPATIENT
Start: 2024-10-27 | End: 2024-11-01 | Stop reason: HOSPADM

## 2024-10-27 RX ORDER — BUDESONIDE AND FORMOTEROL FUMARATE DIHYDRATE 160; 4.5 UG/1; UG/1
2 AEROSOL RESPIRATORY (INHALATION)
Status: DISCONTINUED | OUTPATIENT
Start: 2024-10-27 | End: 2024-11-01 | Stop reason: HOSPADM

## 2024-10-27 RX ORDER — ONDANSETRON 2 MG/ML
4 INJECTION INTRAMUSCULAR; INTRAVENOUS EVERY 6 HOURS PRN
Status: DISCONTINUED | OUTPATIENT
Start: 2024-10-27 | End: 2024-11-01 | Stop reason: HOSPADM

## 2024-10-27 RX ORDER — SODIUM CHLORIDE 0.9 % (FLUSH) 0.9 %
5-40 SYRINGE (ML) INJECTION PRN
Status: DISCONTINUED | OUTPATIENT
Start: 2024-10-27 | End: 2024-11-01 | Stop reason: HOSPADM

## 2024-10-27 RX ORDER — ACETAMINOPHEN 650 MG/1
650 SUPPOSITORY RECTAL EVERY 6 HOURS PRN
Status: DISCONTINUED | OUTPATIENT
Start: 2024-10-27 | End: 2024-11-01 | Stop reason: HOSPADM

## 2024-10-27 RX ORDER — BUSPIRONE HYDROCHLORIDE 5 MG/1
5 TABLET ORAL 3 TIMES DAILY
Status: DISCONTINUED | OUTPATIENT
Start: 2024-10-27 | End: 2024-11-01 | Stop reason: HOSPADM

## 2024-10-27 RX ORDER — POTASSIUM CHLORIDE 1500 MG/1
40 TABLET, EXTENDED RELEASE ORAL PRN
Status: ACTIVE | OUTPATIENT
Start: 2024-10-27 | End: 2024-10-29

## 2024-10-27 RX ORDER — IPRATROPIUM BROMIDE AND ALBUTEROL SULFATE 2.5; .5 MG/3ML; MG/3ML
1 SOLUTION RESPIRATORY (INHALATION) ONCE
Status: COMPLETED | OUTPATIENT
Start: 2024-10-27 | End: 2024-10-27

## 2024-10-27 RX ORDER — OXYCODONE AND ACETAMINOPHEN 5; 325 MG/1; MG/1
1 TABLET ORAL EVERY 4 HOURS PRN
Status: DISCONTINUED | OUTPATIENT
Start: 2024-10-27 | End: 2024-10-29

## 2024-10-27 RX ORDER — POTASSIUM CHLORIDE 7.45 MG/ML
10 INJECTION INTRAVENOUS PRN
Status: ACTIVE | OUTPATIENT
Start: 2024-10-27 | End: 2024-10-29

## 2024-10-27 RX ORDER — POTASSIUM CHLORIDE 750 MG/1
40 TABLET, EXTENDED RELEASE ORAL ONCE
Status: COMPLETED | OUTPATIENT
Start: 2024-10-27 | End: 2024-10-27

## 2024-10-27 RX ADMIN — POTASSIUM CHLORIDE 40 MEQ: 750 TABLET, EXTENDED RELEASE ORAL at 20:39

## 2024-10-27 RX ADMIN — SODIUM CHLORIDE, PRESERVATIVE FREE 10 ML: 5 INJECTION INTRAVENOUS at 23:32

## 2024-10-27 RX ADMIN — PREDNISONE 50 MG: 20 TABLET ORAL at 19:25

## 2024-10-27 RX ADMIN — METHYLPREDNISOLONE SODIUM SUCCINATE 60 MG: 125 INJECTION INTRAMUSCULAR; INTRAVENOUS at 23:57

## 2024-10-27 RX ADMIN — IPRATROPIUM BROMIDE AND ALBUTEROL SULFATE 1 DOSE: .5; 3 SOLUTION RESPIRATORY (INHALATION) at 18:13

## 2024-10-27 RX ADMIN — ASPIRIN 81 MG 324 MG: 81 TABLET ORAL at 19:25

## 2024-10-27 RX ADMIN — WATER 2 ML: 1 INJECTION INTRAMUSCULAR; INTRAVENOUS; SUBCUTANEOUS at 23:57

## 2024-10-27 RX ADMIN — AZITHROMYCIN MONOHYDRATE 500 MG: 500 INJECTION, POWDER, LYOPHILIZED, FOR SOLUTION INTRAVENOUS at 20:43

## 2024-10-27 RX ADMIN — ALBUTEROL SULFATE 2.5 MG: 2.5 SOLUTION RESPIRATORY (INHALATION) at 18:10

## 2024-10-27 RX ADMIN — ALBUTEROL SULFATE 2.5 MG: 2.5 SOLUTION RESPIRATORY (INHALATION) at 18:11

## 2024-10-27 ASSESSMENT — PAIN DESCRIPTION - DESCRIPTORS: DESCRIPTORS: ACHING;DISCOMFORT

## 2024-10-27 ASSESSMENT — PAIN DESCRIPTION - ONSET: ONSET: ON-GOING

## 2024-10-27 ASSESSMENT — PAIN DESCRIPTION - ORIENTATION: ORIENTATION: LEFT

## 2024-10-27 ASSESSMENT — PAIN SCALES - GENERAL: PAINLEVEL_OUTOF10: 4

## 2024-10-27 ASSESSMENT — PAIN - FUNCTIONAL ASSESSMENT: PAIN_FUNCTIONAL_ASSESSMENT: PREVENTS OR INTERFERES SOME ACTIVE ACTIVITIES AND ADLS

## 2024-10-27 ASSESSMENT — PAIN DESCRIPTION - LOCATION: LOCATION: CHEST

## 2024-10-27 ASSESSMENT — PAIN DESCRIPTION - PAIN TYPE: TYPE: ACUTE PAIN

## 2024-10-27 ASSESSMENT — PAIN DESCRIPTION - FREQUENCY: FREQUENCY: INTERMITTENT

## 2024-10-27 NOTE — ED NOTES
Pt states that she became SOB but did not realize her O2 had come unplugged until EMS arrived and told her.  Her saturation at home was 60% per her home SpO2 monitor. Pt reports feeling better now that O2 is back on.

## 2024-10-27 NOTE — ED PROVIDER NOTES
[facility] EMERGENCY DEPARTMENT    CHIEF COMPLAINT  Shortness of Breath (Pt from home with SOB, given duoneb in route.  Pt on 3L at home.  EMS reports diminished lung sounds.)       HISTORY OF PRESENT ILLNESS  Amanda Smith is a 73 y.o. female who presents to the ED with complaint of SOB. Presents from home by EMS. Apparently patient has a baseline O2 req't of 3L nasal canula. It was not plugged in on EMS arrival. Patient received DuoNeb en route. Feeling better now. Patient complains of chest pain \"like something sitting on my chest\", radiates up the left side of her neck, 7/10 intensity, associated with nausea. Denies cough, emesis, or diarrhea. No leg swelling. Smokes up to 1/2 ppd.     I have reviewed the following from the nursing documentation:    Past Medical History:   Diagnosis Date    Cardiac dysrhythmia, unspecified     Chronic airway obstruction, not elsewhere classified (HCC)     Diverticulitis of colon (without mention of hemorrhage)     Hemorrhoids without complication     Hyperlipemia     Leg edema     Mononeuritis of unspecified site     Pneumonia, organism unspecified     Spinal stenosis of lumbar region     Unspecified hearing loss      Past Surgical History:   Procedure Laterality Date    APPENDECTOMY      BACK SURGERY      COLECTOMY      COLON SURGERY      COLOSTOMY      THYROIDECTOMY       Family History   Problem Relation Age of Onset    Arrhythmia Other     Heart Failure Other     COPD Other     Coronary Art Dis Other     Diabetes Other     High Cholesterol Other     Hypertension Other     Lung Cancer Sister      Social History     Socioeconomic History    Marital status:      Spouse name: Not on file    Number of children: Not on file    Years of education: Not on file    Highest education level: Not on file   Occupational History    Occupation: Disabled   Tobacco Use    Smoking status: Every Day     Current packs/day: 1.00     Average packs/day: 1 pack/day for  MetHgb, Vincenzo 0.7 <1.5 %    TC02 (Calc), Vincenzo 48 Not Established mmol/L    O2 Therapy Unknown    Magnesium   Result Value Ref Range    Magnesium 1.90 1.80 - 2.40 mg/dL   Blood Gas, Venous   Result Value Ref Range    pH, Vincenzo 7.285 (L) 7.350 - 7.450    pCO2, Vincenzo 94.9 (H) 40.0 - 50.0 mmHg    PO2, Vincenzo 50 Not Established mmHg    HCO3, Venous 45 (H) 23 - 29 mmol/L    Base Excess, Vincenzo 14.6 Not Established mmol/L    O2 Sat, Vincenzo 83 Not Established %    Carboxyhemoglobin 4.5 %    MetHgb, Vincenzo 0.6 <1.5 %    TC02 (Calc), Vincenzo 48 Not Established mmol/L    O2 Therapy Unknown    Troponin   Result Value Ref Range    Troponin, High Sensitivity 22 (H) 0 - 14 ng/L   EKG 12 Lead   Result Value Ref Range    Ventricular Rate 84 BPM    Atrial Rate 84 BPM    P-R Interval 186 ms    QRS Duration 76 ms    Q-T Interval 320 ms    QTc Calculation (Bazett) 378 ms    P Axis 65 degrees    R Axis 24 degrees    T Axis 63 degrees    Diagnosis       Atrial-paced rhythmLow voltage QRSNonspecific ST and T wave abnormalityAbnormal ECGWhen compared with ECG of 26-SEP-2024 12:18,No significant change was found         RADIOLOGY  I have reviewed all radiographic studies for this visit.   XR CHEST PORTABLE   Final Result   No acute cardiopulmonary findings.                ECG  EKG interpreted independently by myself.   Rate: 84  Rhythm: a-paced  Axis: 24  Intervals: QTc 378  ST Segments: Nonspecific ST/T abnormality   Comparison: Compared to 9/26/24, there is no significant change  Impression: NSR with no acute abnormality     ED COURSE/MDM    73 y.o. female presents with shortness of breath most consistent with COPD exacerbation.  Patient was administered steroids and back-to-back breathing treatments.  She remains diminished.  Her initial VBG showed pH 7.30 with a pCO2 of 90 and a base excess of 15.  This was repeated that is worsening with a pH of 7.28, pCO2 of 95.  Patient will be placed on BiPAP.  She is mentating appropriately.  Her labs are notable for mild

## 2024-10-28 PROBLEM — R79.89 ELEVATED TROPONIN: Status: ACTIVE | Noted: 2024-10-28

## 2024-10-28 LAB
ALBUMIN SERPL-MCNC: 3.2 G/DL (ref 3.4–5)
ALP SERPL-CCNC: 77 U/L (ref 40–129)
ALT SERPL-CCNC: 12 U/L (ref 10–40)
ANION GAP SERPL CALCULATED.3IONS-SCNC: 6 MMOL/L (ref 3–16)
AST SERPL-CCNC: 18 U/L (ref 15–37)
BASE EXCESS BLDV CALC-SCNC: 14.6 MMOL/L
BILIRUB DIRECT SERPL-MCNC: <0.1 MG/DL (ref 0–0.3)
BILIRUB INDIRECT SERPL-MCNC: 0.2 MG/DL (ref 0–1)
BILIRUB SERPL-MCNC: 0.3 MG/DL (ref 0–1)
BUN SERPL-MCNC: 13 MG/DL (ref 7–20)
CALCIUM SERPL-MCNC: 8.3 MG/DL (ref 8.3–10.6)
CHLORIDE SERPL-SCNC: 98 MMOL/L (ref 99–110)
CO2 BLDV-SCNC: 44 MMOL/L
CO2 SERPL-SCNC: 37 MMOL/L (ref 21–32)
COHGB MFR BLDV: 2.8 %
CREAT SERPL-MCNC: 0.4 MG/DL (ref 0.6–1.2)
EKG ATRIAL RATE: 84 BPM
EKG DIAGNOSIS: NORMAL
EKG P AXIS: 65 DEGREES
EKG P-R INTERVAL: 186 MS
EKG Q-T INTERVAL: 320 MS
EKG QRS DURATION: 76 MS
EKG QTC CALCULATION (BAZETT): 378 MS
EKG R AXIS: 24 DEGREES
EKG T AXIS: 63 DEGREES
EKG VENTRICULAR RATE: 84 BPM
GFR SERPLBLD CREATININE-BSD FMLA CKD-EPI: >90 ML/MIN/{1.73_M2}
GLUCOSE SERPL-MCNC: 182 MG/DL (ref 70–99)
HCO3 BLDV-SCNC: 42 MMOL/L (ref 23–29)
HCT VFR BLD AUTO: 36.1 % (ref 36–48)
HGB BLD-MCNC: 12.1 G/DL (ref 12–16)
INR PPP: 1.77 (ref 0.85–1.15)
LACTATE BLDV-SCNC: 1 MMOL/L (ref 0.4–2)
MAGNESIUM SERPL-MCNC: 1.91 MG/DL (ref 1.8–2.4)
METHGB MFR BLDV: 0.5 %
O2 THERAPY: ABNORMAL
PCO2 BLDV: 63.7 MMHG (ref 40–50)
PH BLDV: 7.43 [PH] (ref 7.35–7.45)
PO2 BLDV: 101 MMHG
POTASSIUM SERPL-SCNC: 4.1 MMOL/L (ref 3.5–5.1)
PROCALCITONIN SERPL IA-MCNC: 0.02 NG/ML (ref 0–0.15)
PROT SERPL-MCNC: 5.7 G/DL (ref 6.4–8.2)
PROTHROMBIN TIME: 20.7 SEC (ref 11.9–14.9)
SAO2 % BLDV: 99 %
SODIUM SERPL-SCNC: 141 MMOL/L (ref 136–145)
TROPONIN, HIGH SENSITIVITY: 11 NG/L (ref 0–14)
TROPONIN, HIGH SENSITIVITY: 12 NG/L (ref 0–14)

## 2024-10-28 PROCEDURE — 85610 PROTHROMBIN TIME: CPT

## 2024-10-28 PROCEDURE — 2700000000 HC OXYGEN THERAPY PER DAY

## 2024-10-28 PROCEDURE — 82803 BLOOD GASES ANY COMBINATION: CPT

## 2024-10-28 PROCEDURE — 6370000000 HC RX 637 (ALT 250 FOR IP): Performed by: INTERNAL MEDICINE

## 2024-10-28 PROCEDURE — 2000000000 HC ICU R&B

## 2024-10-28 PROCEDURE — 94660 CPAP INITIATION&MGMT: CPT

## 2024-10-28 PROCEDURE — 80076 HEPATIC FUNCTION PANEL: CPT

## 2024-10-28 PROCEDURE — 6370000000 HC RX 637 (ALT 250 FOR IP): Performed by: HOSPITALIST

## 2024-10-28 PROCEDURE — 85018 HEMOGLOBIN: CPT

## 2024-10-28 PROCEDURE — 6360000002 HC RX W HCPCS: Performed by: HOSPITALIST

## 2024-10-28 PROCEDURE — 94761 N-INVAS EAR/PLS OXIMETRY MLT: CPT

## 2024-10-28 PROCEDURE — 99291 CRITICAL CARE FIRST HOUR: CPT | Performed by: INTERNAL MEDICINE

## 2024-10-28 PROCEDURE — 84145 PROCALCITONIN (PCT): CPT

## 2024-10-28 PROCEDURE — 84484 ASSAY OF TROPONIN QUANT: CPT

## 2024-10-28 PROCEDURE — 93010 ELECTROCARDIOGRAM REPORT: CPT | Performed by: INTERNAL MEDICINE

## 2024-10-28 PROCEDURE — 80048 BASIC METABOLIC PNL TOTAL CA: CPT

## 2024-10-28 PROCEDURE — 2580000003 HC RX 258

## 2024-10-28 PROCEDURE — 99223 1ST HOSP IP/OBS HIGH 75: CPT | Performed by: INTERNAL MEDICINE

## 2024-10-28 PROCEDURE — 83735 ASSAY OF MAGNESIUM: CPT

## 2024-10-28 PROCEDURE — 36415 COLL VENOUS BLD VENIPUNCTURE: CPT

## 2024-10-28 PROCEDURE — 94640 AIRWAY INHALATION TREATMENT: CPT

## 2024-10-28 PROCEDURE — 2580000003 HC RX 258: Performed by: HOSPITALIST

## 2024-10-28 PROCEDURE — 83605 ASSAY OF LACTIC ACID: CPT

## 2024-10-28 PROCEDURE — 85014 HEMATOCRIT: CPT

## 2024-10-28 RX ORDER — WATER 10 ML/10ML
INJECTION INTRAMUSCULAR; INTRAVENOUS; SUBCUTANEOUS
Status: COMPLETED
Start: 2024-10-28 | End: 2024-10-28

## 2024-10-28 RX ORDER — DOXYCYCLINE HYCLATE 100 MG
100 TABLET ORAL EVERY 12 HOURS SCHEDULED
Status: DISCONTINUED | OUTPATIENT
Start: 2024-10-28 | End: 2024-11-01 | Stop reason: HOSPADM

## 2024-10-28 RX ORDER — AZITHROMYCIN 250 MG/1
250 TABLET, FILM COATED ORAL DAILY
Status: DISCONTINUED | OUTPATIENT
Start: 2024-10-28 | End: 2024-10-28

## 2024-10-28 RX ORDER — METHYLPREDNISOLONE SODIUM SUCCINATE 40 MG/ML
40 INJECTION, POWDER, LYOPHILIZED, FOR SOLUTION INTRAMUSCULAR; INTRAVENOUS DAILY
Status: DISCONTINUED | OUTPATIENT
Start: 2024-10-29 | End: 2024-10-29

## 2024-10-28 RX ORDER — WARFARIN SODIUM 5 MG/1
5 TABLET ORAL
Status: COMPLETED | OUTPATIENT
Start: 2024-10-28 | End: 2024-10-28

## 2024-10-28 RX ADMIN — OXYCODONE HYDROCHLORIDE AND ACETAMINOPHEN 1 TABLET: 5; 325 TABLET ORAL at 13:08

## 2024-10-28 RX ADMIN — SOTALOL HYDROCHLORIDE 80 MG: 80 TABLET ORAL at 08:41

## 2024-10-28 RX ADMIN — TRAZODONE HYDROCHLORIDE 100 MG: 100 TABLET ORAL at 20:11

## 2024-10-28 RX ADMIN — BUSPIRONE HYDROCHLORIDE 5 MG: 5 TABLET ORAL at 20:11

## 2024-10-28 RX ADMIN — DOXYCYCLINE HYCLATE 100 MG: 100 TABLET, COATED ORAL at 21:02

## 2024-10-28 RX ADMIN — OXYCODONE HYDROCHLORIDE AND ACETAMINOPHEN 1 TABLET: 5; 325 TABLET ORAL at 08:41

## 2024-10-28 RX ADMIN — IPRATROPIUM BROMIDE AND ALBUTEROL SULFATE 1 DOSE: 2.5; .5 SOLUTION RESPIRATORY (INHALATION) at 19:23

## 2024-10-28 RX ADMIN — WATER 10 ML: 1 INJECTION INTRAMUSCULAR; INTRAVENOUS; SUBCUTANEOUS at 08:40

## 2024-10-28 RX ADMIN — TIOTROPIUM BROMIDE INHALATION SPRAY 2 PUFF: 3.12 SPRAY, METERED RESPIRATORY (INHALATION) at 08:57

## 2024-10-28 RX ADMIN — ESCITALOPRAM OXALATE 10 MG: 10 TABLET ORAL at 08:42

## 2024-10-28 RX ADMIN — BUDESONIDE AND FORMOTEROL FUMARATE DIHYDRATE 2 PUFF: 160; 4.5 AEROSOL RESPIRATORY (INHALATION) at 19:23

## 2024-10-28 RX ADMIN — WARFARIN SODIUM 5 MG: 5 TABLET ORAL at 17:36

## 2024-10-28 RX ADMIN — SODIUM CHLORIDE, PRESERVATIVE FREE 10 ML: 5 INJECTION INTRAVENOUS at 19:52

## 2024-10-28 RX ADMIN — IPRATROPIUM BROMIDE AND ALBUTEROL SULFATE 1 DOSE: 2.5; .5 SOLUTION RESPIRATORY (INHALATION) at 15:54

## 2024-10-28 RX ADMIN — BUSPIRONE HYDROCHLORIDE 5 MG: 5 TABLET ORAL at 13:08

## 2024-10-28 RX ADMIN — SOTALOL HYDROCHLORIDE 80 MG: 80 TABLET ORAL at 20:11

## 2024-10-28 RX ADMIN — MONTELUKAST 10 MG: 10 TABLET, FILM COATED ORAL at 08:41

## 2024-10-28 RX ADMIN — ATORVASTATIN CALCIUM 80 MG: 80 TABLET, FILM COATED ORAL at 20:11

## 2024-10-28 RX ADMIN — ROPINIROLE HYDROCHLORIDE 1 MG: 1 TABLET, FILM COATED ORAL at 21:02

## 2024-10-28 RX ADMIN — SODIUM CHLORIDE, PRESERVATIVE FREE 10 ML: 5 INJECTION INTRAVENOUS at 08:42

## 2024-10-28 RX ADMIN — BUDESONIDE AND FORMOTEROL FUMARATE DIHYDRATE 2 PUFF: 160; 4.5 AEROSOL RESPIRATORY (INHALATION) at 08:57

## 2024-10-28 RX ADMIN — IPRATROPIUM BROMIDE AND ALBUTEROL SULFATE 1 DOSE: 2.5; .5 SOLUTION RESPIRATORY (INHALATION) at 11:53

## 2024-10-28 RX ADMIN — IPRATROPIUM BROMIDE AND ALBUTEROL SULFATE 1 DOSE: 2.5; .5 SOLUTION RESPIRATORY (INHALATION) at 08:57

## 2024-10-28 RX ADMIN — OXYCODONE HYDROCHLORIDE AND ACETAMINOPHEN 1 TABLET: 5; 325 TABLET ORAL at 17:36

## 2024-10-28 RX ADMIN — METHYLPREDNISOLONE SODIUM SUCCINATE 60 MG: 125 INJECTION INTRAMUSCULAR; INTRAVENOUS at 08:40

## 2024-10-28 RX ADMIN — BUSPIRONE HYDROCHLORIDE 5 MG: 5 TABLET ORAL at 08:42

## 2024-10-28 RX ADMIN — LEVOTHYROXINE SODIUM 50 MCG: 0.05 TABLET ORAL at 08:42

## 2024-10-28 RX ADMIN — OXYCODONE HYDROCHLORIDE AND ACETAMINOPHEN 1 TABLET: 5; 325 TABLET ORAL at 21:36

## 2024-10-28 ASSESSMENT — PAIN DESCRIPTION - LOCATION
LOCATION: HEAD
LOCATION: CHEST
LOCATION: HEAD
LOCATION: CHEST
LOCATION: HEAD

## 2024-10-28 ASSESSMENT — PAIN DESCRIPTION - DESCRIPTORS
DESCRIPTORS: ACHING
DESCRIPTORS: ACHING;DISCOMFORT
DESCRIPTORS: ACHING
DESCRIPTORS: ACHING;DISCOMFORT

## 2024-10-28 ASSESSMENT — PAIN SCALES - WONG BAKER
WONGBAKER_NUMERICALRESPONSE: NO HURT

## 2024-10-28 ASSESSMENT — PAIN SCALES - GENERAL
PAINLEVEL_OUTOF10: 4
PAINLEVEL_OUTOF10: 9
PAINLEVEL_OUTOF10: 9
PAINLEVEL_OUTOF10: 8
PAINLEVEL_OUTOF10: 4
PAINLEVEL_OUTOF10: 9
PAINLEVEL_OUTOF10: 4
PAINLEVEL_OUTOF10: 0
PAINLEVEL_OUTOF10: 4
PAINLEVEL_OUTOF10: 9
PAINLEVEL_OUTOF10: 4
PAINLEVEL_OUTOF10: 4

## 2024-10-28 ASSESSMENT — PAIN DESCRIPTION - FREQUENCY
FREQUENCY: CONTINUOUS
FREQUENCY: INTERMITTENT

## 2024-10-28 ASSESSMENT — PAIN DESCRIPTION - PAIN TYPE
TYPE: ACUTE PAIN

## 2024-10-28 ASSESSMENT — PAIN DESCRIPTION - ORIENTATION
ORIENTATION: POSTERIOR
ORIENTATION: POSTERIOR
ORIENTATION: LEFT
ORIENTATION: POSTERIOR
ORIENTATION: LEFT

## 2024-10-28 ASSESSMENT — PAIN DESCRIPTION - ONSET
ONSET: ON-GOING

## 2024-10-28 ASSESSMENT — PAIN - FUNCTIONAL ASSESSMENT
PAIN_FUNCTIONAL_ASSESSMENT: PREVENTS OR INTERFERES SOME ACTIVE ACTIVITIES AND ADLS
PAIN_FUNCTIONAL_ASSESSMENT: ACTIVITIES ARE NOT PREVENTED
PAIN_FUNCTIONAL_ASSESSMENT: PREVENTS OR INTERFERES SOME ACTIVE ACTIVITIES AND ADLS
PAIN_FUNCTIONAL_ASSESSMENT: ACTIVITIES ARE NOT PREVENTED
PAIN_FUNCTIONAL_ASSESSMENT: ACTIVITIES ARE NOT PREVENTED
PAIN_FUNCTIONAL_ASSESSMENT: PREVENTS OR INTERFERES WITH ALL ACTIVE AND SOME PASSIVE ACTIVITIES
PAIN_FUNCTIONAL_ASSESSMENT: ACTIVITIES ARE NOT PREVENTED

## 2024-10-28 NOTE — CARE COORDINATION
Advance Care Planning     Advance Care Planning Activator (Inpatient)  Conversation Note      Date of ACP Conversation: 10/28/2024     Conversation Conducted with: Patient with Decision Making Capacity    ACP Activator: Jason Gomez RN    Health Care Decision Maker:     Current Designated Health Care Decision Maker:     Primary Decision Maker: Harinder Esteban - Child - [phone]    Care Preferences    Ventilation:  \"If you were in your present state of health and suddenly became very ill and were unable to breathe on your own, what would your preference be about the use of a ventilator (breathing machine) if it were available to you?\"      Would the patient desire the use of ventilator (breathing machine)?: yes    \"If your health worsens and it becomes clear that your chance of recovery is unlikely, what would your preference be about the use of a ventilator (breathing machine) if it were available to you?\"     Would the patient desire the use of ventilator (breathing machine)?: Unsure      Resuscitation  \"CPR works best to restart the heart when there is a sudden event, like a heart attack, in someone who is otherwise healthy. Unfortunately, CPR does not typically restart the heart for people who have serious health conditions or who are very sick.\"    \"In the event your heart stopped as a result of an underlying serious health condition, would you want attempts to be made to restart your heart (answer \"yes\" for attempt to resuscitate) or would you prefer a natural death (answer \"no\" for do not attempt to resuscitate)?\" yes       [] Yes   [x] No   Educated Patient / Decision Maker regarding differences between Advance Directives and portable DNR orders.    Length of ACP Conversation in minutes:  5    Conversation Outcomes:  ACP discussion completed    Follow-up plan:    [] Schedule follow-up conversation to continue planning  [] Referred individual to Provider for additional questions/concerns   [] Advised  patient/agent/surrogate to review completed ACP document and update if needed with changes in condition, patient preferences or care setting    [x] This note routed to one or more involved healthcare providers      Jason BUCKLEY RN  Case Management  [phone]    Electronically signed by Jason Gomez RN on 10/28/2024 at 5:41 PM

## 2024-10-28 NOTE — PROGRESS NOTES
[facility]  Respiratory Therapy  Non-Invasive Ventilation    Name:  Amanda Smith  Medical Record Number:  [id number]  Age: 73 y.o.   : 1950  Today's Date:  10/27/2024  Room:  [address] [place] [address]      Assessment      BP (!) 116/50   Pulse 77   Temp 98.1 °F (36.7 °C)   Resp 17   SpO2 96%     Patient Active Problem List   Diagnosis    Cardiac dysrhythmia    Other and unspecified hyperlipidemia    Hearing loss    Diverticulitis of colon    Hemorrhoids    COPD with acute exacerbation (HCC)    Pneumonia due to organism    Spinal stenosis, lumbar region, without neurogenic claudication    Mononeuritis    S/P appendectomy    History of back surgery    History of thyroidectomy, total    Edema    Acute on chronic respiratory failure       Social History  Social History     Tobacco Use    Smoking status: Every Day     Current packs/day: 1.00     Average packs/day: 1 pack/day for 30.0 years (30.0 ttl pk-yrs)     Types: Cigarettes   Substance Use Topics    Alcohol use: Yes     Comment: Occasional alchol use         Set-up patient on V60 BIPAP unit. Settings per MD. Patient tolerating well. No complications noted at this time.        Patient/caregiver was educated on the proper method of use:  Yes      Level of patient/caregiver understanding able to:   [x] Verbalize understanding   [] Demonstrate understanding       [] Teach back        [] Needs reinforcement        []  No available caregiver               []  Other:     Response to education:  Very Good     Teaching Time:  5  minutes     Electronically signed by Mahi Gomez RCP on 10/27/2024 at 8:53 PM

## 2024-10-28 NOTE — PROGRESS NOTES
NAME:  Amanda Smith  YOB: 1950  MEDICAL RECORD NUMBER:  [id number]    Shift Summary: Patient to ED w/ c/o chills, palpitations, diaphoresis increased sob/chest pain with onset at rest at 2 pm today d/t oxygen  becoming unattached. pH = 7.347, pCO2 =78.5, pO2 = 44, HCO3 = 43. Pt AONx4, not in active distress/labored breathing, plan to wear BiPAP all night.      Family updated:     Rhythm: Paced     Most recent vitals:   Visit Vitals  BP (!) 107/50   Pulse 77   Temp 98.1 °F (36.7 °C)   Resp 17   SpO2 96%           No data found.    No data found.      Respiratory support needed (if any):  - O2 - NC - 3 lpm or - BiPAP   IPAP: 15 cmH20, CPAP/EPAP: 5 cmH2O continuous    Admission weight      Today's weight    Wt Readings from Last 1 Encounters:   09/28/24 80 kg (176 lb 5.9 oz)        Mon need assessed each shift: N/A - no mon present  UOP >30ml/hr: YES  Last documented BM (in last 48 hrs):  No data found.             Restraints (in use currently or dc'd in last 12 hrs): No    Order current and documentation up to date? Yes    Lines/Drains reviewed @ bedside.         Drip rates at handoff:       Lab Data:   CBC:   Recent Labs     10/27/24  1807   WBC 6.3   HGB 12.1   HCT 36.5   .9*   *     BMP:    Recent Labs     10/27/24  1807      K 3.4*   CO2 40*   BUN 12   CREATININE 0.4*     LIVR:   Recent Labs     10/27/24  1807   AST 20   ALT 14     PT/INR:   Recent Labs     10/27/24  1807   INR 1.68*     APTT: No results for input(s): \"APTT\" in the last 72 hours.  ABG: No results for input(s): \"PHART\", \"EHC8ESM\", \"PO2ART\" in the last 72 hours.    Any consults during the shift? No    Any signed and held orders to be released?  No        4 Eyes Skin Assessment       The patient is being assessed for  Shift Handoff    I agree that at least one RN has performed a thorough Head to Toe Skin Assessment on the patient. ALL assessment sites listed below have been assessed.      Areas

## 2024-10-28 NOTE — CONSULTS
Pulmonary Consult Note     Patient's name:  Amanda Smith  Medical Record Number: [id number]  Patient's account/billing number: [id number]  Patient's YOB: 1950  Age: 73 y.o.  Date of Admission: 10/27/2024  5:44 PM  Date of Consult: 10/28/2024      Primary Care Physician: Zackery Powers MD      Code Status: Full Code    Reason for consult: Acute respiratory hypoxia and hypercapnia    Assessment and Plan     Acute respiratory with hypoxia and hypercapnia  COPD with acute exacerbation  A-fib sotalol and Coumadin  Obesity      Plan:  Solu-Medrol 40 mg IV daily   doxycycline for 5 days  Symbicort and DuoNeb 4 times daily  O2 titrate to keep sats 88 to 92%  BiPAP nightly  Pharmacy to dose warfarin  Due to the immediate potential for life-threatening deterioration due to above , I spent 33 minutes providing critical care.  This time is excluding time spent performing procedures.  This note was transcribed using Dragon Dictation software. Please disregard any translational errors.        HISTORY OF PRESENT ILLNESS:   Mr./Ms. Amanda Smith is a 73 y.o. lady with past medical history stated below significant for history of COPD on Trelegy daily and albuterol as needed, chronic respiratory failure with hypoxia on 3 L of oxygen, history of atrial fibrillation status post ablations in the past, status post pacemaker, hypothyroidism, active tobacco abuse presented to the hospital with worsening shortness of breath chills diaphoresis and wheezing,      Chest x-ray with no acute cardiopulmonary pathology  Subtherapeutic INR  Acute hypercapnic respiratory failure  COVID and flu negative.    Past Medical History:        Diagnosis Date    Cardiac dysrhythmia, unspecified     Chronic airway obstruction, not elsewhere classified (HCC)     Diverticulitis of colon (without mention of hemorrhage)     Hemorrhoids without complication     Hyperlipemia

## 2024-10-28 NOTE — CARE COORDINATION
Case Management Assessment  Initial Evaluation    Date/Time of Evaluation: 10/28/2024 5:42 PM  Assessment Completed by: Jason Gomez RN    If patient is discharged prior to next notation, then this note serves as note for discharge by case management.    Patient Name: Amanda Smith                   YOB: 1950  Diagnosis: Elevated troponin [R79.89]  COPD exacerbation (HCC) [J44.1]  Subtherapeutic international normalized ratio (INR) [R79.1]  Acute on chronic respiratory failure with hypoxia and hypercapnia [J96.21, J96.22]                   Date / Time: 10/27/2024  5:44 PM    Patient Admission Status: Inpatient   Readmission Risk (Low < 19, Mod (19-27), High > 27): Readmission Risk Score: 18.7    Current PCP: Zackery Powers MD  PCP verified by ? Yes    Chart Reviewed: Yes      History Provided by: Patient  Patient Orientation: Alert and Oriented    Patient Cognition: Alert    Hospitalization in the last 30 days (Readmission):  Yes    If yes, Readmission Assessment in  Navigator will be completed.    Advance Directives:      Code Status: Full Code   Patient's Primary Decision Maker is: Legal Next of Kin    Primary Decision Maker: Harinder Esteban - Child - [phone]    Discharge Planning:    Patient lives with: Children, Family Members Type of Home: (!) Hospice Facility  Primary Care Giver: Self  Patient Support Systems include: Children, Family Members   Current Financial resources: Medicare  Current community resources: None  Current services prior to admission: Durable Medical Equipment, Home Care, Oxygen Therapy (Active with [facility] for skilled nursing only)            Current DME: Walker, Wheelchair, Cane, Oxygen Therapy (Comment), Other (Comment) (Oxygen supplied by [employer] (BL is 3lpm))            Type of Home Care services:  Nursing Services    ADLS  Prior functional level: Assistance with the following:, Cooking, Housework, Shopping, Mobility  Current functional level:  were provided with a choice of provider and agrees with the discharge plan. Freedom of choice list with basic dialogue that supports the patient's individualized plan of care/goals and shares the quality data associated with the providers was provided to: Patient   Patient Representative Name:       The Patient and/or Patient Representative Agree with the Discharge Plan? Yes    Jason Gomez RN  Case Management Department  Ph: [phone] Fax: [phone]

## 2024-10-28 NOTE — CONSULTS
[facility]    Amanda Smith  1950 October 28, 2024    Reason for Consult: Elevated Troponin    CC: Shortness of breath    HPI:  The patient is 73 y.o. female with a past medical history significant for paroxysmal atrial fibrillation and COPD who presented to [facility] ED with shortness of breath. She was admitted to the ICU with a COPD exacerbation. I was asked to see her for elevated troponin assays. She sees Dr. Romeo for her pacemaker.     Amanda states that she came into the hospital with feeling that her heart was racing. She was short of breath and \"couldn't hardly breathe\". She says that she was freezing but \"the sweat was pouring off me\". There was some chest discomfort on the left side but she minimizes this. She felt nauseous.     She is anticoagulated with coumadin for her a-fib.    Review of Systems:  Constitutional: No fatigue, weakness, night sweats or fever.   HEENT: No new vision difficulties or ringing in the ears.  Respiratory: No new SOB, PND, orthopnea or cough.   Cardiovascular: See HPI   GI: No n/v, diarrhea, constipation, abdominal pain or changes in bowel habits.  No melena, no hematochezia  : No urinary frequency, urgency, incontinence, hematuria or dysuria.  Skin: No cyanosis or skin lesions.  Musculoskeletal: No new muscle or joint pain.  Neurological: No syncope or TIA-like symptoms.  Psychiatric: No anxiety, insomnia or depression     Past Medical History:   Diagnosis Date    Cardiac dysrhythmia, unspecified     Chronic airway obstruction, not elsewhere classified (HCC)     Diverticulitis of colon (without mention of hemorrhage)     Hemorrhoids without complication     Hyperlipemia     Leg edema     Mononeuritis of unspecified site     Pneumonia, organism unspecified     Spinal stenosis of lumbar region     Unspecified hearing loss      Past Surgical History:   Procedure Laterality Date    APPENDECTOMY      BACK SURGERY      COLECTOMY         Component Value Date/Time     10/28/2024 03:54 AM    K 4.1 10/28/2024 03:54 AM    BUN 13 10/28/2024 03:54 AM    CREATININE 0.4 10/28/2024 03:54 AM     Recent Labs     10/27/24  1807 10/28/24  0833   WBC 6.3  --    HGB 12.1 12.1   HCT 36.5 36.1   *  --      Limited echocardiogram 7/24/2021:  The left ventricular wall motion is normal.   The Aortic Valve is moderately calcified.   There is mild mitral annular calcification present.   The left atrium is moderately dilated.   Limited study. No doppler avalilable for interpretation.   Limited study. No doppler avalilable for interpretation.     Reason for Study: Z01.89 Evaluate ventricular function.       Procedure: 36214 Limited 2D Echo /Follow-up.       Left Ventricle:   LV endocardium difficult to visualize. LV function probably normal. The left   ventricle is normal in size. There is normal left ventricular wall thickness.   The left ventricular wall motion is normal. No left ventricular thrombus   detected.   IVSd: 1.6 cm (0.6-1.1)                 LV Mass Index: 71.7 grams/m2   LVPWd: 0.99 cm (0.6-1.1)               RWT: 0.62 cm   LVIDd: 3.2 cm (3.6-6.0)                FS: 13.5 %   LVIDs: 2.8 cm       Assessment / Plan:    1.  Elevated troponin  Amanda's troponin assay was trivially elevated.  I do not think this represents any acute coronary syndrome and likely represents demand ischemia if anything at all.  I would not pursue this.  She has no symptoms suggestive of angina.  I think her issues of shortness of breath were due to her COPD exacerbation and not a cardiac cause.    2.  Atrial fibrillation, paroxysmal  Amanda appears to be in an atrial paced rhythm at present.  She is anticoagulated for her atrial fibrillation with Coumadin.  She follows with Dr. Romeo for this.  No need to pursue any other therapy or workup.  Continue Coumadin.    3.  COPD exacerbation  Further treatment and therapy per the primary team.  Please feel free to call with  any concerns or questions    We will sign off for now.  Please feel free to call with any concerns or questions.  Thank you very much for allowing me to participate in the care of your patient.

## 2024-10-28 NOTE — PROGRESS NOTES
Clinical Pharmacy Note  Warfarin Consult    Amanda Smith is a 73 y.o. female receiving warfarin managed by pharmacy.      Warfarin Indication: Afib  Target INR range: 2-3   Dose prior to admission: 5mg daily as of 10/25/24 (per patient INR on 10/25/24 was 3.4 taken by home health nurse. Was told to hold her dose that day and resume at 5mg daily - previously was on 5mg daily except 7.5mg on Wednesdays and Saturday. Patient confirmed she took her 5mg doses on 10/26/24 and 10/27/24)    Current warfarin drug-drug interactions: methylprednisolone    Recent Labs     10/27/24  1807 10/28/24  0354   HGB 12.1  --    HCT 36.5  --    INR 1.68* 1.77*       Assessment/Plan:    Warfarin 2.5 mg ordered last night so that patient would receive a total of 7.5 mg yesterday. Warfarin 2.5 mg dose not given.    Will order 5 mg tonight due to DDI with methylprednisolone and recent supratherapeutic INR as outpatient.    Thank you for the consult.  Will continue to follow.    Minnie Henriquez, AleishaD.  10/28/2024  7:39 AM

## 2024-10-28 NOTE — PROGRESS NOTES
NAME:  Amanda Smith  YOB: 1950  MEDICAL RECORD NUMBER:  [id number]    Shift Summary: BIPAP removed this AM and put on 3L NC. Patient VPRN oxycodone given X3 for headache. Cardiology s/o. Patient originally downgraded to PCU but changed back to ICU d/t run of V-tach. Patient experienced 2.2 second of v-tach. Patient was symptomatic during this episode and states, \"my heart felt like it skipped a few beats. I have never experienced this before.\" Hospitalist notified and per MD, keep in ICU and draw magnesium level- 1.91.     Family updated: Yes. Son     Rhythm: Paced     Most recent vitals:   Visit Vitals  BP (!) 108/59   Pulse 76   Temp 98.9 °F (37.2 °C) (Oral)   Resp 17   Ht 1.651 m (5' 5\")   Wt 74.7 kg (164 lb 10.9 oz)   SpO2 100%   BMI 27.40 kg/m²           No data found.    No data found.      Respiratory support needed (if any):  - O2 - NC - 3 lpm    Admission weight Weight - Scale: 74.7 kg (164 lb 10.9 oz) (10/27/24 2242)    Today's weight    Wt Readings from Last 1 Encounters:   10/27/24 74.7 kg (164 lb 10.9 oz)        Mon need assessed each shift: N/A - no mon present  UOP >30ml/hr: YES  Last documented BM (in last 48 hrs):  Patient Vitals for the past 48 hrs:   Last BM (including prior to admit) Stool Occurrence   10/27/24 2242 10/26/24 --   10/28/24 0800 10/26/24 --   10/28/24 1200 10/28/24 1                Restraints (in use currently or dc'd in last 12 hrs): No        Lines/Drains reviewed @ bedside.  Peripheral IV 10/27/24 Proximal;Right Forearm (Active)   Number of days: 0         Drip rates at handoff:    sodium chloride         Lab Data:   CBC:   Recent Labs     10/27/24  1807 10/28/24  0833   WBC 6.3  --    HGB 12.1 12.1   HCT 36.5 36.1   .9*  --    *  --      BMP:    Recent Labs     10/27/24  1807 10/28/24  0354    141   K 3.4* 4.1   CO2 40* 37*   BUN 12 13   CREATININE 0.4* 0.4*     LIVR:   Recent Labs     10/27/24  1807 10/28/24  0354   AST 20 18

## 2024-10-28 NOTE — OR NURSING
4 Eyes Skin Assessment     NAME:  Amanda Smith  YOB: 1950  MEDICAL RECORD NUMBER:  [id number]    The patient is being assessed for  Shift Handoff    I agree that at least one RN has performed a thorough Head to Toe Skin Assessment on the patient. ALL assessment sites listed below have been assessed.      Areas assessed by both nurses:    Head, Face, Ears, Shoulders, Back, Chest, Arms, Elbows, Hands, Sacrum. Buttock, Coccyx, Ischium, Legs. Feet and Heels, Under Medical Devices , and Other          Does the Patient have a Wound? No noted wound(s)       Bernardo Prevention initiated by RN: Yes  Wound Care Orders initiated by RN: No    Pressure Injury (Stage 3,4, Unstageable, DTI, NWPT, and Complex wounds) if present, place Wound referral order by RN under : No    New Ostomies, if present place, Ostomy referral order under : No     Nurse 1 eSignature: Electronically signed by Rene Martins RN on 10/27/24 at 11:34 PM EDT    **SHARE this note so that the co-signing nurse can place an eSignature**    Nurse 2 eSignature: Electronically signed by Misti Ruiz RN on 10/27/24 at 11:37 PM EDT

## 2024-10-28 NOTE — CONSULTS
Clinical Pharmacy Note  Warfarin Consult    Amanda Smith is a 73 y.o. female receiving warfarin managed by pharmacy.      Warfarin Indication: Afib  Target INR range: 2-3   Dose prior to admission: 5mg daily as of 10/25/24 (per patient INR on 10/25/24 was 3.4 taken by home health nurse. Was told to hold her dose that day and resume at 5mg daily - previously was on 5mg daily except 7.5mg on Wednesdays and Saturday. Patient confirmed she took her 5mg doses on 10/26/24 and 10/27/24)    Current warfarin drug-drug interactions: escitalopram (taking together at home), levothyroxine (taking together at home), methylprednisolone (monitor for increased INR), ropinirole (taking together at home), and trazodone (taking together at home)    Recent Labs     10/27/24  1807   HGB 12.1   HCT 36.5   INR 1.68*       Assessment/Plan:    Warfarin 2.5 mg tonight (in addition to the 5mg dose patient took today). Daily PT/INR until stable within therapeutic range.     Thank you for the consult.  Will continue to follow.    Brandi Daniel, PharmD

## 2024-10-28 NOTE — PROGRESS NOTES
V2.0    [facility] Progress Note      Name:  Amanda Smith /Age/Sex: 1950  (73 y.o. female)   MRN & CSN:  [id number] & [id number] Encounter Date/Time: 10/28/2024 7:22 AM EDT   Location:  [id number] PCP: Zacekry Powers MD     Attending:Berlin Apodaca MD       Hospital Day: 2    Assessment and Recommendations   Amanda Smith is a 73 y.o. female who presents with Acute on chronic respiratory failure with hypoxia and hypercapnia      Plan:   Acute on acute respiratory failure with hypoxia and hypercapnia due to COPD exacerbation admitted to ICU, closely monitor oxygen supply oxygen to keep saturation above 90%, will repeat ABG, patient still at risk for needing of intubation and life-threatening complications continue to be on Solu-Medrol, pCO2 improved from 90.2 down to 78.5 then down to 63.7 pH improved continue to be on 35% FiO2  A-fib on sotalol and Coumadin continue for now monitor hemoglobin for potential bleeding General Left INR not therapeutic at 1.77 this morning  Chest pain with troponin elevation no history of CAD at this time trend likely type II MI  Hypokalemia on admission replaced  Minimally elevated troponin likely due to respiratory failure but patient with chest pain, on Coumadin, will consult cardiology      Total critical care time including ordering and following on critical labs, ordering critical IV medication in a patient with potential life-threatening complications, discussion with other subspecialty including pulmonology, and not including any procedure time 32 minutes    Diet Diet NPO   DVT Prophylaxis [] Lovenox, []  Heparin, [] SCDs, [] Ambulation,  [] Eliquis, [] Xarelto  [] Coumadin   Code Status Full Code             Personally reviewed Lab Studies and Imaging     Discussed management of the case with pulmonology who recommended keep on oxygen    Telemetry strip independently interpreted by myself heart rate 72 QTc 0 47 no ST elevation    Imaging        Medications:   Medications:    atorvastatin  80 mg Oral QHS    busPIRone  5 mg Oral TID    escitalopram  10 mg Oral Daily    budesonide-formoterol  2 puff Inhalation BID RT    And    tiotropium  2 puff Inhalation Daily RT    levothyroxine  50 mcg Oral Daily    montelukast  10 mg Oral Daily    rOPINIRole  1 mg Oral Nightly    sotalol  80 mg Oral BID    traZODone  100 mg Oral Nightly    ipratropium 0.5 mg-albuterol 2.5 mg  1 Dose Inhalation Q4H WA RT    methylPREDNISolone  60 mg IntraVENous Q8H    sodium chloride flush  5-40 mL IntraVENous 2 times per day    warfarin placeholder: dosing by pharmacy   Other RX Placeholder    warfarin  2.5 mg Oral Once    nicotine  1 patch TransDERmal Daily      Infusions:    sodium chloride       PRN Meds: albuterol sulfate HFA, 2 puff, Q6H PRN  furosemide, 20 mg, Daily PRN  promethazine, 25 mg, Q6H PRN  sodium chloride flush, 5-40 mL, PRN  sodium chloride, , PRN  potassium chloride, 40 mEq, PRN   Or  potassium alternative oral replacement, 40 mEq, PRN   Or  potassium chloride, 10 mEq, PRN  magnesium sulfate, 2,000 mg, PRN  ondansetron, 4 mg, Q8H PRN   Or  ondansetron, 4 mg, Q6H PRN  polyethylene glycol, 17 g, Daily PRN  acetaminophen, 650 mg, Q6H PRN   Or  acetaminophen, 650 mg, Q6H PRN  oxyCODONE-acetaminophen, 1 tablet, Q4H PRN        Labs and Imaging   XR CHEST PORTABLE    Result Date: 10/27/2024  EXAMINATION: ONE XRAY VIEW OF THE CHEST 10/27/2024 5:11 pm COMPARISON: Chest radiograph 09/26/2024. HISTORY: ORDERING SYSTEM PROVIDED HISTORY: CP,. SOB TECHNOLOGIST PROVIDED HISTORY: Reason for exam:->CP,. SOB Reason for Exam: cp, sob FINDINGS: Stable positioning of a left chest cardiac support device with leads terminating over the right atrium and ventricle, and spinal stimulator leads terminating over the mid thoracic spine. No focal consolidation, large pleural effusion, or pneumothorax. Stable cardiomediastinal silhouette. No acute osseous abnormality.     No acute

## 2024-10-28 NOTE — PROGRESS NOTES
VBG results:    Latest Reference Range & Units 10/28/24 03:54   pH, Vincenzo 7.350 - 7.450  7.428   pCO2, Vincenzo 40.0 - 50.0 mmHg 63.7 (H)   pO2, Vnicenzo Not Established mmHg 101   Bicarbonate, Venous 23 - 29 mmol/L 42 (H)   TC02 (Calc), Vincenzo Not Established mmol/L 44   Base Excess, Vincenzo Not Established mmol/L 14.6   MetHgb, Vincenzo <1.5 % 0.5   O2 Saturation Venous Not Established % 99   (H): Data is abnormally high    Notified RT of updated results. Continuing BiPAP while patient remains calm and asleep.     Electronically signed by Rene Martins RN on 10/28/2024 at 5:19 AM

## 2024-10-28 NOTE — H&P
Absolute 0.5 0.0 - 1.3 K/uL    Eosinophils Absolute 0.1 0.0 - 0.6 K/uL    Basophils Absolute 0.0 0.0 - 0.2 K/uL   Comprehensive Metabolic Panel w/ Reflex to MG    Collection Time: 10/27/24  6:07 PM   Result Value Ref Range    Sodium 143 136 - 145 mmol/L    Potassium reflex Magnesium 3.4 (L) 3.5 - 5.1 mmol/L    Chloride 97 (L) 99 - 110 mmol/L    CO2 40 (H) 21 - 32 mmol/L    Anion Gap 6 3 - 16    Glucose 152 (H) 70 - 99 mg/dL    BUN 12 7 - 20 mg/dL    Creatinine 0.4 (L) 0.6 - 1.2 mg/dL    Est, Glom Filt Rate >90 >60    Calcium 8.5 8.3 - 10.6 mg/dL    Total Protein 5.7 (L) 6.4 - 8.2 g/dL    Albumin 3.3 (L) 3.4 - 5.0 g/dL    Albumin/Globulin Ratio 1.4 1.1 - 2.2    Total Bilirubin 0.4 0.0 - 1.0 mg/dL    Alkaline Phosphatase 79 40 - 129 U/L    ALT 14 10 - 40 U/L    AST 20 15 - 37 U/L   Troponin    Collection Time: 10/27/24  6:07 PM   Result Value Ref Range    Troponin, High Sensitivity 16 (H) 0 - 14 ng/L   Brain Natriuretic Peptide    Collection Time: 10/27/24  6:07 PM   Result Value Ref Range    NT Pro- (H) 0 - 124 pg/mL   Protime-INR    Collection Time: 10/27/24  6:07 PM   Result Value Ref Range    Protime 19.9 (H) 11.9 - 14.9 sec    INR 1.68 (H) 0.85 - 1.15   Blood Gas, Venous    Collection Time: 10/27/24  6:07 PM   Result Value Ref Range    pH, Vincenzo 7.305 (L) 7.350 - 7.450    pCO2, Vincenzo 90.2 (H) 40.0 - 50.0 mmHg    PO2, Vincenzo 66 Not Established mmHg    HCO3, Venous 45 (H) 23 - 29 mmol/L    Base Excess, Vincenzo 14.7 Not Established mmol/L    O2 Sat, Vincenzo 93 Not Established %    Carboxyhemoglobin 4.2 %    MetHgb, Vincenzo 0.7 <1.5 %    TC02 (Calc), Vincenzo 48 Not Established mmol/L    O2 Therapy Unknown    Rapid influenza A/B antigens    Collection Time: 10/27/24  6:07 PM    Specimen: Nasopharyngeal   Result Value Ref Range    Rapid Influenza A Ag Negative Negative    Rapid Influenza B Ag Negative Negative   COVID-19, Rapid    Collection Time: 10/27/24  6:07 PM    Specimen: Nasopharyngeal Swab   Result Value Ref Range     SARS-CoV-2, NAAT Not Detected Not Detected   Magnesium    Collection Time: 10/27/24  6:07 PM   Result Value Ref Range    Magnesium 1.90 1.80 - 2.40 mg/dL   EKG 12 Lead    Collection Time: 10/27/24  6:12 PM   Result Value Ref Range    Ventricular Rate 84 BPM    Atrial Rate 84 BPM    P-R Interval 186 ms    QRS Duration 76 ms    Q-T Interval 320 ms    QTc Calculation (Bazett) 378 ms    P Axis 65 degrees    R Axis 24 degrees    T Axis 63 degrees    Diagnosis       Atrial-paced rhythmLow voltage QRSNonspecific ST and T wave abnormalityAbnormal ECGWhen compared with ECG of 26-SEP-2024 12:18,No significant change was found   Blood Gas, Venous    Collection Time: 10/27/24  7:52 PM   Result Value Ref Range    pH, Vincenzo 7.285 (L) 7.350 - 7.450    pCO2, Vincenzo 94.9 (H) 40.0 - 50.0 mmHg    PO2, Vincenzo 50 Not Established mmHg    HCO3, Venous 45 (H) 23 - 29 mmol/L    Base Excess, Vincenzo 14.6 Not Established mmol/L    O2 Sat, Vincenzo 83 Not Established %    Carboxyhemoglobin 4.5 %    MetHgb, Vincenzo 0.6 <1.5 %    TC02 (Calc), Vincenzo 48 Not Established mmol/L    O2 Therapy Unknown    Troponin    Collection Time: 10/27/24  8:21 PM   Result Value Ref Range    Troponin, High Sensitivity 22 (H) 0 - 14 ng/L   Blood Gas, Venous    Collection Time: 10/27/24 10:25 PM   Result Value Ref Range    pH, Vincenzo 7.347 (L) 7.350 - 7.450    pCO2, Vincenzo 78.5 (H) 40.0 - 50.0 mmHg    PO2, Vincenzo 44 Not Established mmHg    HCO3, Venous 43 (H) 23 - 29 mmol/L    Base Excess, Vincenzo 14.0 Not Established mmol/L    O2 Sat, Vincenzo 80 Not Established %    Carboxyhemoglobin 3.6 %    MetHgb, Vincenzo 0.5 <1.5 %    TC02 (Calc), Vincenzo 45 Not Established mmol/L        Imaging/Diagnostics Last 24 Hours   XR CHEST PORTABLE    Result Date: 10/27/2024  EXAMINATION: ONE XRAY VIEW OF THE CHEST 10/27/2024 5:11 pm COMPARISON: Chest radiograph 09/26/2024. HISTORY: ORDERING SYSTEM PROVIDED HISTORY: CP,. SOB TECHNOLOGIST PROVIDED HISTORY: Reason for exam:->CP,. SOB Reason for Exam: cp, sob FINDINGS: Stable  positioning of a left chest cardiac support device with leads terminating over the right atrium and ventricle, and spinal stimulator leads terminating over the mid thoracic spine. No focal consolidation, large pleural effusion, or pneumothorax. Stable cardiomediastinal silhouette. No acute osseous abnormality.     No acute cardiopulmonary findings.       Assessment      Hospital Problems             Last Modified POA    * (Principal) Acute on chronic respiratory failure with hypoxia and hypercapnia 10/27/2024 Yes       Plan   Acute on chronic hypoxemic respiratory failure with hyerpcapnia, copd exacerbation, blood gases with pCO2 initiall trending upwards, will continue to omintor in the ICU. Started on systemic steroids, HHN  Afib - continue sotalol, coumadin  Chest pain - trop elev, denies prior history of CAD, continue to trend, suspect type II nstemi, EKG without stemi  Total crit care time 45 min      Consultations Ordered:  IP CONSULT TO HOSPITALIST  IP CONSULT TO PHARMACY  IP CONSULT TO PULMONOLOGY    Electronically signed by Yara Isidro Jr, MD on 10/27/24 at 11:34 PM EDT

## 2024-10-29 ENCOUNTER — APPOINTMENT (OUTPATIENT)
Age: 74
End: 2024-10-29
Attending: INTERNAL MEDICINE
Payer: MEDICARE

## 2024-10-29 LAB
ALBUMIN SERPL-MCNC: 3.2 G/DL (ref 3.4–5)
ANION GAP SERPL CALCULATED.3IONS-SCNC: 17 MMOL/L (ref 3–16)
BUN SERPL-MCNC: 15 MG/DL (ref 7–20)
CALCIUM SERPL-MCNC: 8.6 MG/DL (ref 8.3–10.6)
CHLORIDE SERPL-SCNC: 98 MMOL/L (ref 99–110)
CO2 SERPL-SCNC: 26 MMOL/L (ref 21–32)
CREAT SERPL-MCNC: 0.5 MG/DL (ref 0.6–1.2)
DEPRECATED RDW RBC AUTO: 14.6 % (ref 12.4–15.4)
ECHO AO ROOT DIAM: 2.4 CM
ECHO AO ROOT INDEX: 1.28 CM/M2
ECHO BSA: 1.91 M2
ECHO LA AREA 2C: 16.6 CM2
ECHO LA AREA 4C: 25 CM2
ECHO LA DIAMETER INDEX: 1.66 CM/M2
ECHO LA DIAMETER: 3.1 CM
ECHO LA MAJOR AXIS: 7.1 CM
ECHO LA MINOR AXIS: 6.6 CM
ECHO LA TO AORTIC ROOT RATIO: 1.29
ECHO LA VOL BP: 51 ML (ref 22–52)
ECHO LA VOL MOD A2C: 36 ML (ref 22–52)
ECHO LA VOL MOD A4C: 50 ML (ref 22–52)
ECHO LA VOL/BSA BIPLANE: 27 ML/M2 (ref 16–34)
ECHO LA VOLUME INDEX MOD A2C: 19 ML/M2 (ref 16–34)
ECHO LA VOLUME INDEX MOD A4C: 27 ML/M2 (ref 16–34)
ECHO LV E' LATERAL VELOCITY: 9.2 CM/S
ECHO LV E' SEPTAL VELOCITY: 8.8 CM/S
ECHO LV EDV A4C: 67 ML
ECHO LV EDV INDEX A4C: 36 ML/M2
ECHO LV EF PHYSICIAN: 60 %
ECHO LV EJECTION FRACTION A4C: 63 %
ECHO LV ESV A4C: 25 ML
ECHO LV ESV INDEX A4C: 13 ML/M2
ECHO LV FRACTIONAL SHORTENING: 41 % (ref 28–44)
ECHO LV INTERNAL DIMENSION DIASTOLE INDEX: 2.46 CM/M2
ECHO LV INTERNAL DIMENSION DIASTOLIC: 4.6 CM (ref 3.9–5.3)
ECHO LV INTERNAL DIMENSION SYSTOLIC INDEX: 1.44 CM/M2
ECHO LV INTERNAL DIMENSION SYSTOLIC: 2.7 CM
ECHO LV IVSD: 1 CM (ref 0.6–0.9)
ECHO LV MASS 2D: 158.8 G (ref 67–162)
ECHO LV MASS INDEX 2D: 84.9 G/M2 (ref 43–95)
ECHO LV POSTERIOR WALL DIASTOLIC: 1 CM (ref 0.6–0.9)
ECHO LV RELATIVE WALL THICKNESS RATIO: 0.43
ECHO LVOT AREA: 3.1 CM2
ECHO LVOT DIAM: 2 CM
ECHO MV A VELOCITY: 1.15 M/S
ECHO MV E DECELERATION TIME (DT): 214 MS
ECHO MV E VELOCITY: 1.22 M/S
ECHO MV E/A RATIO: 1.06
ECHO MV E/E' LATERAL: 13.26
ECHO MV E/E' RATIO (AVERAGED): 13.56
ECHO MV E/E' SEPTAL: 13.86
ECHO MV MAX VELOCITY: 1.3 M/S
ECHO MV MEAN GRADIENT: 3 MMHG
ECHO MV MEAN VELOCITY: 0.8 M/S
ECHO MV PEAK GRADIENT: 6 MMHG
ECHO MV REGURGITANT PEAK GRADIENT: 71 MMHG
ECHO MV REGURGITANT PEAK VELOCITY: 4.2 M/S
ECHO MV VTI: 35.9 CM
ECHO RV FREE WALL PEAK S': 11.7 CM/S
ECHO RV INTERNAL DIMENSION: 1.8 CM
ECHO RV TAPSE: 2.9 CM (ref 1.7–?)
GFR SERPLBLD CREATININE-BSD FMLA CKD-EPI: >90 ML/MIN/{1.73_M2}
GLUCOSE SERPL-MCNC: 128 MG/DL (ref 70–99)
HCT VFR BLD AUTO: 35.3 % (ref 36–48)
HGB BLD-MCNC: 12 G/DL (ref 12–16)
INR PPP: 1.99 (ref 0.85–1.15)
MAGNESIUM SERPL-MCNC: 2.29 MG/DL (ref 1.8–2.4)
MCH RBC QN AUTO: 34.8 PG (ref 26–34)
MCHC RBC AUTO-ENTMCNC: 34 G/DL (ref 31–36)
MCV RBC AUTO: 102.3 FL (ref 80–100)
PHOSPHATE SERPL-MCNC: 1.8 MG/DL (ref 2.5–4.9)
PHOSPHATE SERPL-MCNC: 1.9 MG/DL (ref 2.5–4.9)
PHOSPHATE SERPL-MCNC: 2.8 MG/DL (ref 2.5–4.9)
PLATELET # BLD AUTO: 118 K/UL (ref 135–450)
PMV BLD AUTO: 11 FL (ref 5–10.5)
POTASSIUM SERPL-SCNC: 4 MMOL/L (ref 3.5–5.1)
PROTHROMBIN TIME: 22.6 SEC (ref 11.9–14.9)
RBC # BLD AUTO: 3.45 M/UL (ref 4–5.2)
SODIUM SERPL-SCNC: 141 MMOL/L (ref 136–145)
WBC # BLD AUTO: 7 K/UL (ref 4–11)

## 2024-10-29 PROCEDURE — 6370000000 HC RX 637 (ALT 250 FOR IP): Performed by: INTERNAL MEDICINE

## 2024-10-29 PROCEDURE — 93321 DOPPLER ECHO F-UP/LMTD STD: CPT

## 2024-10-29 PROCEDURE — 85610 PROTHROMBIN TIME: CPT

## 2024-10-29 PROCEDURE — 85027 COMPLETE CBC AUTOMATED: CPT

## 2024-10-29 PROCEDURE — 83735 ASSAY OF MAGNESIUM: CPT

## 2024-10-29 PROCEDURE — 6370000000 HC RX 637 (ALT 250 FOR IP): Performed by: REGISTERED NURSE

## 2024-10-29 PROCEDURE — 84100 ASSAY OF PHOSPHORUS: CPT

## 2024-10-29 PROCEDURE — 2700000000 HC OXYGEN THERAPY PER DAY

## 2024-10-29 PROCEDURE — 2580000003 HC RX 258: Performed by: HOSPITALIST

## 2024-10-29 PROCEDURE — 97116 GAIT TRAINING THERAPY: CPT

## 2024-10-29 PROCEDURE — 99233 SBSQ HOSP IP/OBS HIGH 50: CPT | Performed by: INTERNAL MEDICINE

## 2024-10-29 PROCEDURE — 2500000003 HC RX 250 WO HCPCS: Performed by: FAMILY MEDICINE

## 2024-10-29 PROCEDURE — 2580000003 HC RX 258: Performed by: FAMILY MEDICINE

## 2024-10-29 PROCEDURE — 6370000000 HC RX 637 (ALT 250 FOR IP): Performed by: HOSPITALIST

## 2024-10-29 PROCEDURE — 94640 AIRWAY INHALATION TREATMENT: CPT

## 2024-10-29 PROCEDURE — 2060000000 HC ICU INTERMEDIATE R&B

## 2024-10-29 PROCEDURE — 94761 N-INVAS EAR/PLS OXIMETRY MLT: CPT

## 2024-10-29 PROCEDURE — 80069 RENAL FUNCTION PANEL: CPT

## 2024-10-29 PROCEDURE — 97165 OT EVAL LOW COMPLEX 30 MIN: CPT

## 2024-10-29 PROCEDURE — 97530 THERAPEUTIC ACTIVITIES: CPT

## 2024-10-29 PROCEDURE — 36415 COLL VENOUS BLD VENIPUNCTURE: CPT

## 2024-10-29 PROCEDURE — 93321 DOPPLER ECHO F-UP/LMTD STD: CPT | Performed by: INTERNAL MEDICINE

## 2024-10-29 PROCEDURE — 6360000002 HC RX W HCPCS: Performed by: INTERNAL MEDICINE

## 2024-10-29 PROCEDURE — 97161 PT EVAL LOW COMPLEX 20 MIN: CPT

## 2024-10-29 PROCEDURE — 93325 DOPPLER ECHO COLOR FLOW MAPG: CPT | Performed by: INTERNAL MEDICINE

## 2024-10-29 PROCEDURE — 93308 TTE F-UP OR LMTD: CPT | Performed by: INTERNAL MEDICINE

## 2024-10-29 PROCEDURE — 2580000003 HC RX 258

## 2024-10-29 RX ORDER — BUTALBITAL, ACETAMINOPHEN AND CAFFEINE 50; 325; 40 MG/1; MG/1; MG/1
1 TABLET ORAL EVERY 6 HOURS PRN
Status: DISCONTINUED | OUTPATIENT
Start: 2024-10-29 | End: 2024-11-01 | Stop reason: HOSPADM

## 2024-10-29 RX ORDER — PREDNISONE 20 MG/1
40 TABLET ORAL DAILY
Status: DISCONTINUED | OUTPATIENT
Start: 2024-10-30 | End: 2024-11-01 | Stop reason: HOSPADM

## 2024-10-29 RX ORDER — TRAMADOL HYDROCHLORIDE 50 MG/1
25 TABLET ORAL ONCE
Status: COMPLETED | OUTPATIENT
Start: 2024-10-29 | End: 2024-10-29

## 2024-10-29 RX ORDER — WATER 10 ML/10ML
INJECTION INTRAMUSCULAR; INTRAVENOUS; SUBCUTANEOUS
Status: COMPLETED
Start: 2024-10-29 | End: 2024-10-29

## 2024-10-29 RX ORDER — OXYCODONE AND ACETAMINOPHEN 5; 325 MG/1; MG/1
1 TABLET ORAL EVERY 4 HOURS PRN
Status: DISCONTINUED | OUTPATIENT
Start: 2024-10-29 | End: 2024-10-29

## 2024-10-29 RX ORDER — WARFARIN SODIUM 5 MG/1
5 TABLET ORAL
Status: COMPLETED | OUTPATIENT
Start: 2024-10-29 | End: 2024-10-29

## 2024-10-29 RX ADMIN — ATORVASTATIN CALCIUM 80 MG: 80 TABLET, FILM COATED ORAL at 20:48

## 2024-10-29 RX ADMIN — SODIUM CHLORIDE, PRESERVATIVE FREE 10 ML: 5 INJECTION INTRAVENOUS at 07:47

## 2024-10-29 RX ADMIN — BUTALBITAL, ACETAMINOPHEN AND CAFFEINE 1 TABLET: 325; 50; 40 TABLET ORAL at 19:30

## 2024-10-29 RX ADMIN — SODIUM CHLORIDE: 9 INJECTION, SOLUTION INTRAVENOUS at 08:37

## 2024-10-29 RX ADMIN — DOXYCYCLINE HYCLATE 100 MG: 100 TABLET, COATED ORAL at 08:30

## 2024-10-29 RX ADMIN — WATER 10 ML: 1 INJECTION INTRAMUSCULAR; INTRAVENOUS; SUBCUTANEOUS at 07:47

## 2024-10-29 RX ADMIN — SOTALOL HYDROCHLORIDE 80 MG: 80 TABLET ORAL at 20:48

## 2024-10-29 RX ADMIN — WARFARIN SODIUM 5 MG: 5 TABLET ORAL at 17:30

## 2024-10-29 RX ADMIN — BUSPIRONE HYDROCHLORIDE 5 MG: 5 TABLET ORAL at 20:48

## 2024-10-29 RX ADMIN — SODIUM CHLORIDE, PRESERVATIVE FREE 10 ML: 5 INJECTION INTRAVENOUS at 20:48

## 2024-10-29 RX ADMIN — IPRATROPIUM BROMIDE AND ALBUTEROL SULFATE 1 DOSE: 2.5; .5 SOLUTION RESPIRATORY (INHALATION) at 11:48

## 2024-10-29 RX ADMIN — SODIUM PHOSPHATE, MONOBASIC, MONOHYDRATE AND SODIUM PHOSPHATE, DIBASIC, ANHYDROUS 15 MMOL: 142; 276 INJECTION, SOLUTION INTRAVENOUS at 08:39

## 2024-10-29 RX ADMIN — DOXYCYCLINE HYCLATE 100 MG: 100 TABLET, COATED ORAL at 20:48

## 2024-10-29 RX ADMIN — IPRATROPIUM BROMIDE AND ALBUTEROL SULFATE 1 DOSE: 2.5; .5 SOLUTION RESPIRATORY (INHALATION) at 07:38

## 2024-10-29 RX ADMIN — BUDESONIDE AND FORMOTEROL FUMARATE DIHYDRATE 2 PUFF: 160; 4.5 AEROSOL RESPIRATORY (INHALATION) at 07:38

## 2024-10-29 RX ADMIN — ROPINIROLE HYDROCHLORIDE 1 MG: 1 TABLET, FILM COATED ORAL at 20:48

## 2024-10-29 RX ADMIN — IPRATROPIUM BROMIDE AND ALBUTEROL SULFATE 1 DOSE: 2.5; .5 SOLUTION RESPIRATORY (INHALATION) at 16:55

## 2024-10-29 RX ADMIN — BUDESONIDE AND FORMOTEROL FUMARATE DIHYDRATE 2 PUFF: 160; 4.5 AEROSOL RESPIRATORY (INHALATION) at 19:42

## 2024-10-29 RX ADMIN — ESCITALOPRAM OXALATE 10 MG: 10 TABLET ORAL at 07:46

## 2024-10-29 RX ADMIN — LEVOTHYROXINE SODIUM 50 MCG: 0.05 TABLET ORAL at 06:10

## 2024-10-29 RX ADMIN — IPRATROPIUM BROMIDE AND ALBUTEROL SULFATE 1 DOSE: 2.5; .5 SOLUTION RESPIRATORY (INHALATION) at 19:42

## 2024-10-29 RX ADMIN — BUSPIRONE HYDROCHLORIDE 5 MG: 5 TABLET ORAL at 07:46

## 2024-10-29 RX ADMIN — BUTALBITAL, ACETAMINOPHEN AND CAFFEINE 1 TABLET: 325; 50; 40 TABLET ORAL at 11:37

## 2024-10-29 RX ADMIN — TRAZODONE HYDROCHLORIDE 100 MG: 100 TABLET ORAL at 20:48

## 2024-10-29 RX ADMIN — OXYCODONE HYDROCHLORIDE AND ACETAMINOPHEN 1 TABLET: 5; 325 TABLET ORAL at 08:13

## 2024-10-29 RX ADMIN — SOTALOL HYDROCHLORIDE 80 MG: 80 TABLET ORAL at 07:46

## 2024-10-29 RX ADMIN — OXYCODONE HYDROCHLORIDE AND ACETAMINOPHEN 1 TABLET: 5; 325 TABLET ORAL at 03:32

## 2024-10-29 RX ADMIN — TRAMADOL HYDROCHLORIDE 25 MG: 50 TABLET ORAL at 16:10

## 2024-10-29 RX ADMIN — BUSPIRONE HYDROCHLORIDE 5 MG: 5 TABLET ORAL at 13:12

## 2024-10-29 RX ADMIN — MONTELUKAST 10 MG: 10 TABLET, FILM COATED ORAL at 07:46

## 2024-10-29 RX ADMIN — METHYLPREDNISOLONE SODIUM SUCCINATE 40 MG: 40 INJECTION INTRAMUSCULAR; INTRAVENOUS at 07:47

## 2024-10-29 ASSESSMENT — PAIN DESCRIPTION - FREQUENCY
FREQUENCY: CONTINUOUS

## 2024-10-29 ASSESSMENT — PAIN DESCRIPTION - DESCRIPTORS
DESCRIPTORS: ACHING

## 2024-10-29 ASSESSMENT — PAIN DESCRIPTION - ORIENTATION
ORIENTATION: POSTERIOR
ORIENTATION: POSTERIOR;UPPER
ORIENTATION: POSTERIOR

## 2024-10-29 ASSESSMENT — PAIN DESCRIPTION - LOCATION
LOCATION: HEAD

## 2024-10-29 ASSESSMENT — PAIN DESCRIPTION - PAIN TYPE
TYPE: ACUTE PAIN
TYPE: ACUTE PAIN;CHRONIC PAIN
TYPE: ACUTE PAIN
TYPE: ACUTE PAIN

## 2024-10-29 ASSESSMENT — PAIN - FUNCTIONAL ASSESSMENT
PAIN_FUNCTIONAL_ASSESSMENT: ACTIVITIES ARE NOT PREVENTED
PAIN_FUNCTIONAL_ASSESSMENT: PREVENTS OR INTERFERES WITH ALL ACTIVE AND SOME PASSIVE ACTIVITIES
PAIN_FUNCTIONAL_ASSESSMENT: ACTIVITIES ARE NOT PREVENTED

## 2024-10-29 ASSESSMENT — PAIN DESCRIPTION - ONSET
ONSET: ON-GOING

## 2024-10-29 ASSESSMENT — PAIN SCALES - GENERAL
PAINLEVEL_OUTOF10: 9
PAINLEVEL_OUTOF10: 10
PAINLEVEL_OUTOF10: 9
PAINLEVEL_OUTOF10: 9
PAINLEVEL_OUTOF10: 5
PAINLEVEL_OUTOF10: 3

## 2024-10-29 NOTE — PROGRESS NOTES
Physical Therapy  Facility/Department: [address] ICU  Physical Therapy Initial Assessment    Name: Amanda Smith  : 1950  MRN: [id number]  Date of Service: 10/29/2024    Discharge Recommendations:  Continue to assess pending progress, Home with assist PRN   PT Equipment Recommendations  Other: None anticipated.      Amanda Smith scored a 19/24 on the AM-PAC short mobility form.  At this time, no further PT is recommended upon discharge .  Assessment  Body Structures, Functions, Activity Limitations Requiring Skilled Therapeutic Intervention: Decreased functional mobility ;Decreased endurance;Decreased balance  Assessment: 72 y/o female admit 10/27/2024 with Acute Respiratory, COPD Exac, Elevated Troponin. PMH as noted including COPD, PAF, Pacemaker, Diverticulitis, Spinal Stenosis, Back Surg, Thyroidectomy.  PTA pt living with family in home with few steps to enter and 1st floor bed/bath; primarily sponge bathes/assist daily care as needed and amb with Walker within home (O2 3L baseline).  Pt currently requiring O2 3L, martha oob, transfers/amb short distances within hospital room setting with Walker CGA.  Pt reports adequate assist/support for d/c home; denies need for home PT.  Suspect pt at/near baseline upon return home.  Will cont to monitor pt's progress.  Therapy Prognosis: Good  Decision Making: Low Complexity  History: 72 y/o female admit 10/27/2024 with Acute Respiratory, COPD Exac, Elevated Troponin. PMH as noted including COPD, PAF, Pacemaker, Diverticulitis, Spinal Stenosis, Back Surg, Thyroidectomy.  Exam: See above.  Clinical Presentation: See above.  Barriers to Learning: None.  Requires PT Follow-Up: Yes  Activity Tolerance  Activity Tolerance: Patient tolerated treatment well    Plan  Physical Therapy Plan  General Plan: 3-5 times per week  Current Treatment Recommendations: Strengthening, Therapeutic activities, Functional mobility training, Transfer training, Gait training,  WFL    Objective          Gross Assessment  AROM: Generally decreased, functional  Strength: Generally decreased, functional                   Bed mobility  Supine to Sit: Stand by assistance (HOB elevated.)  Transfers  Sit to Stand: Stand by assistance  Stand to Sit: Stand by assistance  Ambulation  Surface: Level tile  Device: Rolling Walker  Distance: Pt amb 4-5 steps bed to chair, additional 40' with Walker SBA/CGA.  No LE buckling/giving way; cues for safe transitional mvts although no specific LOB.             AM-[facility] - Mobility    AM-PAC Basic Mobility - Inpatient   How much help is needed turning from your back to your side while in a flat bed without using bedrails?: None  How much help is needed moving from lying on your back to sitting on the side of a flat bed without using bedrails?: A Little  How much help is needed moving to and from a bed to a chair?: A Little  How much help is needed standing up from a chair using your arms?: A Little  How much help is needed walking in hospital room?: A Little  How much help is needed climbing 3-5 steps with a railing?: A Little  [facility] Inpatient Mobility Raw Score : 19  AM-PAC Inpatient T-Scale Score : 45.44  Mobility Inpatient CMS 0-100% Score: 41.77  Mobility Inpatient CMS G-Code Modifier : CK       Goals  Short Term Goals  Time Frame for Short Term Goals: Upon d/c acute care setting.  Short Term Goal 1: Bed Mob Supervision.  Short Term Goal 2: Transfers with assist device Supervision.  Short Term Goal 3: Amb with/without assist device ' SBA/Supervision.  Patient Goals   Patient Goals : Return home.       Education  Patient Education  Education Given To: Patient  Education Provided Comments: Role of PT, POC, Need to call for assist, Safe use of Walker.  Education Method: Verbal;Demonstration  Barriers to Learning: None  Education Outcome: Verbalized understanding;Continued education needed      Therapy Time   Individual Concurrent Group Co-treatment   Time

## 2024-10-29 NOTE — PROGRESS NOTES
Pulmonary progress Note     Patient's name:  Amanda Smith  Medical Record Number: [id number]  Patient's account/billing number: [id number]  Patient's YOB: 1950  Age: 73 y.o.  Date of Admission: 10/27/2024  5:44 PM  Date of Consult: 10/29/2024      Primary Care Physician: Zackery Powers MD      Code Status: Full Code    Reason for consult: Acute respiratory hypoxia and hypercapnia    Assessment and Plan     Acute on chronic respiratory with hypercapnia  COPD with acute exacerbation  A-fib sotalol and Coumadin  Obesity      Plan:  Prednisone for additional 4 days  doxycycline for 5 days  Symbicort and DuoNeb 4 times daily  O2 titrate to keep sats 88 to 92%  BiPAP nightly  Pharmacy to dose warfarin      Subjective:  Nonsustained Vtach overnight reported  Wore bipap      HISTORY OF PRESENT ILLNESS:   /Ms. Amanda Smith is a 73 y.o. lady with past medical history stated below significant for history of COPD on Trelegy daily and albuterol as needed, chronic respiratory failure with hypoxia on 3 L of oxygen, history of atrial fibrillation status post ablations in the past, status post pacemaker, hypothyroidism, active tobacco abuse presented to the hospital with worsening shortness of breath chills diaphoresis and wheezing,      Chest x-ray with no acute cardiopulmonary pathology  Subtherapeutic INR  Acute hypercapnic respiratory failure  COVID and flu negative.          Family History:       Problem Relation Age of Onset    Arrhythmia Other     Heart Failure Other     COPD Other     Coronary Art Dis Other     Diabetes Other     High Cholesterol Other     Hypertension Other     Lung Cancer Sister          Social History:   TOBACCO:   reports that she has been smoking cigarettes. She has a 30 pack-year smoking history. She does not have any smokeless tobacco history on file.  ETOH:   reports current alcohol use.  DRUGS:  has no history  10/29/24  0338   WBC 7.0   HGB 12.0   *     BMP:    Recent Labs     10/27/24  1807 10/28/24  0354 10/29/24  0338    141 141   K 3.4* 4.1 4.0   CL 97* 98* 98*   CO2 40* 37* 26   BUN 12 13 15   CREATININE 0.4* 0.4* 0.5*   GLUCOSE 152* 182* 128*     S. Calcium:  Recent Labs     10/29/24  0338   CALCIUM 8.6     S. Ionized Calcium:Invalid input(s): \"IONCA\"  S. Magnesium:  Recent Labs     10/29/24  0338   MG 2.29     S. Phosphorus:  Recent Labs     10/29/24  0338   PHOS 1.8*     S. Glucose:No results for input(s): \"POCGLU\" in the last 72 hours.  Glycosylated hemoglobin A1C: No results for input(s): \"LABA1C\" in the last 72 hours.  INR:   Recent Labs     10/29/24  0339   INR 1.99*     Hepatic functions:   Recent Labs     10/28/24  0354   ALKPHOS 77   ALT 12   AST 18   BILITOT 0.3   BILIDIR <0.1     Pancreatic functions:  Recent Labs     10/28/24  0354   LACTA 1.0     S. Lactic Acid:   Recent Labs     10/28/24  0354   LACTA 1.0     Cardiac enzymes:No results for input(s): \"CKTOTAL\", \"CKMB\", \"CKMBINDEX\", \"TROPONINI\" in the last 72 hours.  BNP:No results for input(s): \"BNP\" in the last 72 hours.  Lipid profile: No results for input(s): \"CHOL\", \"TRIG\", \"HDL\", \"LDL\" in the last 72 hours.    Invalid input(s): \"LDLCALC\"  Blood Gases: No results found for: \"PH\", \"PCO2\", \"PO2\", \"HCO3\", \"O2SAT\"  Thyroid functions: No results found for: \"T4\", \"TSH\"       Radiology Review:  Pertinent images / reports were reviewed as a part of this visit.      CXR portable: Results for orders placed during the hospital encounter of 10/27/24    XR CHEST PORTABLE    Narrative  EXAMINATION:  ONE XRAY VIEW OF THE CHEST    10/27/2024 5:11 pm    COMPARISON:  Chest radiograph 09/26/2024.    HISTORY:  ORDERING SYSTEM PROVIDED HISTORY: CP,. SOB  TECHNOLOGIST PROVIDED HISTORY:  Reason for exam:->CP,. SOB  Reason for Exam: cp, sob    FINDINGS:  Stable positioning of a left chest cardiac support device with leads  terminating over the right atrium and

## 2024-10-29 NOTE — PROGRESS NOTES
V2.0    [facility] [place] Note      Name:  Amanda Smith /Age/Sex: 1950  (73 y.o. female)   MRN & CSN:  [id number] & [id number] Encounter Date/Time: 10/29/2024 9:26 AM EDT   Location:  [id number] PCP: Zackery Powers MD     Attending:Berlin Apodaca MD       Hospital Day: 3    Assessment and Recommendations   Amanda Smith is a 73 y.o. female who presents with Acute on chronic respiratory failure with hypoxia and hypercapnia      Plan:   Acute on acute respiratory failure with hypoxia and hypercapnia due to COPD exacerbation admitted to ICU, closely monitor oxygen supply oxygen to keep saturation above 90%, continue IV Solu-Medrol discussed with critical care team pCO2 improved yesterday down to 63.7  A-fib on sotalol and Coumadin continue for now monitor hemoglobin INR improved up to 1.99 near therapeutic  Chest pain with troponin elevation no history of CAD at this time trend likely type II MI cardiology consulted no further workup recommended  Hypokalemia on admission replaced  Minimally elevated troponin likely due to respiratory failure plan as above  Hypophosphatemia replace with IV supplement  Metabolic acidosis l, repeat CMP  Thrombocytopenia, platelet count 118 repeat CBC in a.m.        Diet ADULT DIET; Regular; Low Sodium (2 gm)   DVT Prophylaxis [] Lovenox, []  Heparin, [] SCDs, [] Ambulation,  [] Eliquis, [] Xarelto  [] Coumadin   Code Status Full Code             Personally reviewed Lab Studies and Imaging     Discussed management of the case with pulmonology who recommended keep on steroids    Telemetry strip independently interpreted by myself heart rate 70 QTc 05 no ST elevation      Drugs that require monitoring for toxicity include Solu-Medrol and the method of monitoring was blood glucose to avoid hypoglycemia as toxicity    Medical Decision Making:  The following items were considered in medical decision making:  Discussion of patient care with other  providers  Reviewed clinical lab tests  Reviewed radiology tests  Reviewed other diagnostic tests/interventions  Independent review of radiologic images  Microbiology cultures and other micro tests reviewed      Subjective:     Chief Complaint: Shortness of breath    Amanda Smith is a 73 y.o. female who presents with shortness of breath improved no fever chills nausea or vomiting      Review of Systems:      Pertinent positives and negatives discussed in HPI    Objective:     Intake/Output Summary (Last 24 hours) at 10/29/2024 0926  Last data filed at 10/29/2024 0800  Gross per 24 hour   Intake 915 ml   Output 1575 ml   Net -660 ml      Vitals:   Vitals:    10/29/24 0800 10/29/24 0813 10/29/24 0843 10/29/24 0900   BP: (!) 148/76   138/68   Pulse: 75   70   Resp: 12 24 15 16   Temp: 98.4 °F (36.9 °C)      TempSrc: Axillary      SpO2: 100%   (!) 85%   Weight:       Height:             Physical Exam:      Physical Exam Performed:    /68   Pulse 70   Temp 98.4 °F (36.9 °C) (Axillary)   Resp 16   Ht 1.651 m (5' 5\")   Wt 79.6 kg (175 lb 7.8 oz)   SpO2 (!) 85%   BMI 29.20 kg/m²     General appearance: No apparent distress  Respiratory:  improved breath sounds  Cardiovascular: Regular rate and rhythm with normal S1/S2 without murmurs, rubs or gallops.  Abdomen: Soft, non-tender  Musculoskeletal: No clubbing, cyanosis  Neurologic:  No focal weakness   Psychiatric: Alert and oriented  Capillary Refill: Brisk, 3 seconds, normal   Peripheral Pulses: +2 palpable, equal bilaterally       Medications:   Medications:    warfarin  5 mg Oral Once    methylPREDNISolone  40 mg IntraVENous Daily    doxycycline hyclate  100 mg Oral 2 times per day    atorvastatin  80 mg Oral QHS    busPIRone  5 mg Oral TID    escitalopram  10 mg Oral Daily    budesonide-formoterol  2 puff Inhalation BID RT    levothyroxine  50 mcg Oral Daily    montelukast  10 mg Oral Daily    rOPINIRole  1 mg Oral Nightly    sotalol  80 mg Oral BID     traZODone  100 mg Oral Nightly    ipratropium 0.5 mg-albuterol 2.5 mg  1 Dose Inhalation Q4H WA RT    sodium chloride flush  5-40 mL IntraVENous 2 times per day    warfarin placeholder: dosing by pharmacy   Other RX Placeholder    nicotine  1 patch TransDERmal Daily      Infusions:    sodium chloride 5 mL/hr at 10/29/24 0837     PRN Meds: oxyCODONE-acetaminophen, 1 tablet, Q4H PRN  sodium phosphate 15 mmol in sodium chloride 0.9 % 250 mL IVPB, 15 mmol, PRN  albuterol sulfate HFA, 2 puff, Q6H PRN  furosemide, 20 mg, Daily PRN  promethazine, 25 mg, Q6H PRN  sodium chloride flush, 5-40 mL, PRN  sodium chloride, , PRN  potassium chloride, 40 mEq, PRN   Or  potassium alternative oral replacement, 40 mEq, PRN   Or  potassium chloride, 10 mEq, PRN  magnesium sulfate, 2,000 mg, PRN  ondansetron, 4 mg, Q8H PRN   Or  ondansetron, 4 mg, Q6H PRN  polyethylene glycol, 17 g, Daily PRN  acetaminophen, 650 mg, Q6H PRN   Or  acetaminophen, 650 mg, Q6H PRN        Labs and Imaging   XR CHEST PORTABLE    Result Date: 10/27/2024  EXAMINATION: ONE XRAY VIEW OF THE CHEST 10/27/2024 5:11 pm COMPARISON: Chest radiograph 09/26/2024. HISTORY: ORDERING SYSTEM PROVIDED HISTORY: CP,. SOB TECHNOLOGIST PROVIDED HISTORY: Reason for exam:->CP,. SOB Reason for Exam: cp, sob FINDINGS: Stable positioning of a left chest cardiac support device with leads terminating over the right atrium and ventricle, and spinal stimulator leads terminating over the mid thoracic spine. No focal consolidation, large pleural effusion, or pneumothorax. Stable cardiomediastinal silhouette. No acute osseous abnormality.     No acute cardiopulmonary findings.       CBC:   Recent Labs     10/27/24  1807 10/28/24  0833 10/29/24  0338   WBC 6.3  --  7.0   HGB 12.1 12.1 12.0   *  --  118*     BMP:    Recent Labs     10/27/24  1807 10/28/24  0354 10/29/24  0338    141 141   K 3.4* 4.1 4.0   CL 97* 98* 98*   CO2 40* 37* 26   BUN 12 13 15   CREATININE 0.4* 0.4* 0.5*

## 2024-10-29 NOTE — PROGRESS NOTES
Clinical Pharmacy Note  Warfarin Consult    Amanda Smith is a 73 y.o. female receiving warfarin managed by pharmacy.      Warfarin Indication: Afib  Target INR range: 2-3   Dose prior to admission: 5mg daily as of 10/25/24 (per patient INR on 10/25/24 was 3.4 taken by home health nurse. Was told to hold her dose that day and resume at 5mg daily - previously was on 5mg daily except 7.5mg on Wednesdays and Saturday. Patient confirmed she took her 5mg doses on 10/26/24 and 10/27/24)    Current warfarin drug-drug interactions: methylprednisolone    Recent Labs     10/27/24  1807 10/28/24  0354 10/28/24  0833 10/29/24  0338 10/29/24  0339   HGB 12.1  --  12.1 12.0  --    HCT 36.5  --  36.1 35.3*  --    INR 1.68* 1.77*  --   --  1.99*       Assessment/Plan:    Will repeat 5 mg warfarin tonight.    Thank you for the consult.  Will continue to follow.    Minnie Henriquez, PharmD.  10/29/2024  7:15 AM

## 2024-10-29 NOTE — PROGRESS NOTES
NAME:  Amanda Smith  YOB: 1950  MEDICAL RECORD NUMBER:  [id number]    Shift Summary: Sodium phos replaced- recheck 2.8. Echocardiogram today showed EF 60%. Downgraded to PCU. PT/OT assessed patient- contact guard with walker. PRN percocet D/C'd & PRN foricet ordered for continuing headaches. One-time dose tramadol given for headaches.     Family updated: No    Rhythm: Paced     Most recent vitals:   Visit Vitals  /68   Pulse 70   Temp 98.4 °F (36.9 °C) (Axillary)   Resp 16   Ht 1.651 m (5' 5\")   Wt 79.6 kg (175 lb 7.8 oz)   SpO2 (!) 85%   BMI 29.20 kg/m²           No data found.    No data found.      Respiratory support needed (if any):  - O2 - NC - 3 lpm    Admission weight Weight - Scale: 74.7 kg (164 lb 10.9 oz) (10/27/24 2242)    Today's weight    Wt Readings from Last 1 Encounters:   10/29/24 79.6 kg (175 lb 7.8 oz)        Mon need assessed each shift: N/A - no mon present  UOP >30ml/hr: YES  Last documented BM (in last 48 hrs):  Patient Vitals for the past 48 hrs:   Last BM (including prior to admit) Stool Occurrence   10/27/24 2242 10/26/24 --   10/28/24 0800 10/26/24 --   10/28/24 1200 10/28/24 1   10/28/24 2000 10/28/24 --   10/29/24 0800 10/28/24 --                Restraints (in use currently or dc'd in last 12 hrs): No        Lines/Drains reviewed @ bedside.  Peripheral IV 10/27/24 Proximal;Right Forearm (Active)   Number of days: 1         Drip rates at handoff:    sodium chloride 5 mL/hr at 10/29/24 0837       Lab Data:   CBC:   Recent Labs     10/27/24  1807 10/28/24  0833 10/29/24  0338   WBC 6.3  --  7.0   HGB 12.1 12.1 12.0   HCT 36.5 36.1 35.3*   .9*  --  102.3*   *  --  118*     BMP:    Recent Labs     10/28/24  0354 10/29/24  0338    141   K 4.1 4.0   CO2 37* 26   BUN 13 15   CREATININE 0.4* 0.5*     LIVR:   Recent Labs     10/27/24  1807 10/28/24  0354   AST 20 18   ALT 14 12     PT/INR:   Recent Labs     10/28/24  0354 10/29/24  0339   INR  1.77* 1.99*     APTT: No results for input(s): \"APTT\" in the last 72 hours.  ABG: No results for input(s): \"PHART\", \"OMX5QGE\", \"PO2ART\" in the last 72 hours.    Any consults during the shift? No    Any signed and held orders to be released?  No        4 Eyes Skin Assessment       The patient is being assessed for  Shift Handoff    I agree that at least one RN has performed a thorough Head to Toe Skin Assessment on the patient. ALL assessment sites listed below have been assessed.      Areas assessed by both nurses: Head, Face, Ears, Shoulders, Back, Chest, Arms, Elbows, Hands, Sacrum. Buttock, Coccyx, Ischium, Legs. Feet and Heels, and Under Medical Devices         Does the Patient have a Wound? No noted wound(s)    Wound Care Orders initiated by RN: No       Bernardo Prevention initiated by RN: Yes    Pressure Injury (Stage 3,4, Unstageable, DTI, NWPT, and Complex wounds) if present, place Wound referral order by RN under : No    New Ostomies, if present place, Ostomy referral order under : No     Nurse 1 eSignature: Electronically signed by Laura Saldana RN on 10/29/24 at 9:59 AM EDT    **SHARE this note so that the co-signing nurse can place an eSignature**    Nurse 2 eSignature: Electronically signed by Vanda Clinton RN on 10/29/24 at 8:36 PM EDT

## 2024-10-29 NOTE — PROGRESS NOTES
Patient's son, Harinder, called for patient updates. All updates provided & questions answered at this time.

## 2024-10-29 NOTE — PROGRESS NOTES
Occupational Therapy  Facility/Department: [address] ICU  Occupational Therapy Initial Assessment    Name: Amanda Smith  : 1950  MRN: [id number]  Date of Service: 10/29/2024    Discharge Recommendations:  24 hour supervision or assist  OT Equipment Recommendations  Equipment Needed: No     Amanda Smith scored a 19/24 on the AM-PAC ADL Inpatient form.  At this time, no further OT is recommended upon discharge.  Recommend patient returns to prior setting with prior services.       Patient Diagnosis(es): The primary encounter diagnosis was COPD exacerbation (HCC). Diagnoses of Acute on chronic respiratory failure with hypoxia and hypercapnia, Elevated troponin, Subtherapeutic international normalized ratio (INR), and Shortness of breath were also pertinent to this visit.  Past Medical History:  has a past medical history of Cardiac dysrhythmia, unspecified, Chronic airway obstruction, not elsewhere classified (HCC), Diverticulitis of colon (without mention of hemorrhage), Hemorrhoids without complication, Hyperlipemia, Leg edema, Mononeuritis of unspecified site, Pneumonia, organism unspecified, Spinal stenosis of lumbar region, and Unspecified hearing loss.  Past Surgical History:  has a past surgical history that includes Appendectomy; back surgery; Colon surgery; colostomy; Thyroidectomy; and colectomy.           Assessment  Performance deficits / Impairments: Decreased functional mobility ;Decreased safe awareness;Decreased balance;Decreased ADL status;Decreased high-level IADLs;Decreased endurance  Assessment: 72 y/o female admitted 10/27/2024 with COPD exacerbation. PTA pt lives at home with family and was ind with ADLs and functional mobility with walker. Pt reports wearing 3L O2 at baseline. Today, pt required CGA for transfers and functional mobility around room with RW. Pt able to maintain O2 above 90% on 3L with activity. Pt declined ADLs and anticipate will require CGA/min A for full  Independent (With Walker.)  Transfer Assistance: Independent  Active : No (Son drives.)  Additional Comments: Family will provide 24/7 care at discharge    Objective  Safety Devices  Type of Devices: Call light within reach;Nurse notified;Gait belt;Chair alarm in place;Left in chair           ADL  Feeding: Setup  Feeding Skilled Clinical Factors: set up breakfast tray and pt able to feed self  LE Dressing: Minimal assistance  LE Dressing Skilled Clinical Factors: assist to chapito pants sitting in chair  Functional Mobility: Stand by assistance;Contact guard assistance  Functional Mobility Skilled Clinical Factors: Pt ambulated few steps bed > chair and then around room with RW and SBA/CGA  Additional Comments: Anticipate pt will be min A for LB bathing/dressing and toileting, SBA for UB bathing/dressing and grooming when seated based on balance and endurance observed  Skin Care: Bath wipes        Bed mobility  Supine to Sit: Stand by assistance (HOB elevated)  Sit to Supine:  (pt in chair at end of session)  Transfers  Sit to stand: Stand by assistance  Stand to sit: Stand by assistance  Transfer Comments: to/from RW    Vision  Vision: Within Functional Limits  Hearing  Hearing: Within functional limits    Cognition  Overall Cognitive Status: WFL  Orientation  Overall Orientation Status: Within Functional Limits          Education Given To: Patient  Education Provided: Role of Therapy;Plan of Care;Transfer Training  Education Method: Demonstration;Verbal  Barriers to Learning: None  Education Outcome: Verbalized understanding;Demonstrated understanding    LUE AROM (degrees)  LUE AROM : WFL  Left Hand AROM (degrees)  Left Hand AROM: WFL  RUE AROM (degrees)  RUE AROM : WFL  Right Hand AROM (degrees)  Right Hand AROM: WFL      AM-PAC - ADL  AM-PAC Daily Activity - Inpatient   How much help is needed for putting on and taking off regular lower body clothing?: A Little  How much help is needed for bathing (which

## 2024-10-29 NOTE — PROGRESS NOTES
NAME:  Amanda Smith  YOB: 1950  MEDICAL RECORD NUMBER:  [id number]    Shift Summary: uneventful shift, no ectopy noted on monitor, has been atrial paced. Wore bipap for about 4.5 hours overnight. Pt continues to c/o of severe headaches, PRN Percocet given x2. Pt able to stand and pivot to bedside commode with no issues, PT/OT consulted to work with pt today. Normally gets around at home with a walker.    Family updated: No    Rhythm: Paced -atrial paced    Most recent vitals:   Visit Vitals  /60   Pulse 70   Temp 98.8 °F (37.1 °C) (Oral)   Resp 13   Ht 1.651 m (5' 5\")   Wt 79.6 kg (175 lb 7.8 oz)   SpO2 100%   BMI 29.20 kg/m²               Respiratory support needed (if any):  - O2 - NC - 3 lpm-which is home dose    Admission weight Weight - Scale: 74.7 kg (164 lb 10.9 oz) (10/27/24 2242)    Today's weight    Wt Readings from Last 1 Encounters:   10/29/24 79.6 kg (175 lb 7.8 oz)        Mon need assessed each shift: N/A - no mon present  UOP >30ml/hr: YES total of 775 ml voided per commode last night (avg 64 ml/hr)  Last documented BM (in last 48 hrs):  Patient Vitals for the past 48 hrs:   Last BM (including prior to admit) Stool Occurrence   10/27/24 2242 10/26/24 --   10/28/24 0800 10/26/24 --   10/28/24 1200 10/28/24 1   10/28/24 2000 10/28/24 --                Restraints (in use currently or dc'd in last 12 hrs): No        Lines/Drains reviewed @ bedside.  Peripheral IV 10/27/24 Proximal;Right Forearm (Active)   Number of days: 1         Drip rates at handoff:    sodium chloride         Lab Data:   CBC:   Recent Labs     10/27/24  1807 10/28/24  0833 10/29/24  0338   WBC 6.3  --  7.0   HGB 12.1 12.1 12.0   HCT 36.5 36.1 35.3*   .9*  --  102.3*   *  --  118*     BMP:    Recent Labs     10/27/24  1807 10/28/24  0354    141   K 3.4* 4.1   CO2 40* 37*   BUN 12 13   CREATININE 0.4* 0.4*     LIVR:   Recent Labs     10/27/24  1807 10/28/24  0354   AST 20 18   ALT

## 2024-10-29 NOTE — PLAN OF CARE
Problem: Pain  Goal: Verbalizes/displays adequate comfort level or baseline comfort level  Outcome: Not Progressing  Flowsheets (Taken 10/28/2024 1951 by Sofia Cunningham, RN)  Verbalizes/displays adequate comfort level or baseline comfort level:   Encourage patient to monitor pain and request assistance   Assess pain using appropriate pain scale   Administer analgesics based on type and severity of pain and evaluate response     Problem: Discharge Planning  Goal: Discharge to home or other facility with appropriate resources  Outcome: Progressing  Flowsheets (Taken 10/28/2024 2000 by Sofia Cunningham, RN)  Discharge to home or other facility with appropriate resources: Identify barriers to discharge with patient and caregiver     Problem: Safety - Adult  Goal: Free from fall injury  Outcome: Progressing  Flowsheets (Taken 10/28/2024 2000 by Sofia Cunningham, RN)  Free From Fall Injury: Instruct family/caregiver on patient safety     Problem: ABCDS Injury Assessment  Goal: Absence of physical injury  Outcome: Progressing  Flowsheets (Taken 10/28/2024 2000 by Sofia Cunningham, RN)  Absence of Physical Injury: Implement safety measures based on patient assessment     Problem: Skin/Tissue Integrity  Goal: Absence of new skin breakdown  Description: 1.  Monitor for areas of redness and/or skin breakdown  2.  Assess vascular access sites hourly  3.  Every 4-6 hours minimum:  Change oxygen saturation probe site  4.  Every 4-6 hours:  If on nasal continuous positive airway pressure, respiratory therapy assess nares and determine need for appliance change or resting period.  Outcome: Progressing     Problem: Pain  Goal: Verbalizes/displays adequate comfort level or baseline comfort level  Outcome: Not Progressing  Flowsheets (Taken 10/28/2024 1951 by Sofia Cunningham, RN)  Verbalizes/displays adequate comfort level or baseline comfort level:   Encourage patient to monitor pain and request assistance   Assess pain using appropriate pain

## 2024-10-30 LAB
ALBUMIN SERPL-MCNC: 2.8 G/DL (ref 3.4–5)
ALBUMIN/GLOB SERPL: 1.4 {RATIO} (ref 1.1–2.2)
ALP SERPL-CCNC: 55 U/L (ref 40–129)
ALT SERPL-CCNC: 13 U/L (ref 10–40)
ANION GAP SERPL CALCULATED.3IONS-SCNC: 5 MMOL/L (ref 3–16)
AST SERPL-CCNC: 18 U/L (ref 15–37)
BASOPHILS # BLD: 0 K/UL (ref 0–0.2)
BASOPHILS NFR BLD: 0.3 %
BILIRUB SERPL-MCNC: <0.2 MG/DL (ref 0–1)
BUN SERPL-MCNC: 15 MG/DL (ref 7–20)
CALCIUM SERPL-MCNC: 8.1 MG/DL (ref 8.3–10.6)
CHLORIDE SERPL-SCNC: 100 MMOL/L (ref 99–110)
CO2 SERPL-SCNC: 35 MMOL/L (ref 21–32)
CREAT SERPL-MCNC: 0.5 MG/DL (ref 0.6–1.2)
DEPRECATED RDW RBC AUTO: 14.7 % (ref 12.4–15.4)
EOSINOPHIL # BLD: 0 K/UL (ref 0–0.6)
EOSINOPHIL NFR BLD: 0 %
GFR SERPLBLD CREATININE-BSD FMLA CKD-EPI: >90 ML/MIN/{1.73_M2}
GLUCOSE SERPL-MCNC: 109 MG/DL (ref 70–99)
HCT VFR BLD AUTO: 30.6 % (ref 36–48)
HGB BLD-MCNC: 10.6 G/DL (ref 12–16)
INR PPP: 2.44 (ref 0.85–1.15)
LYMPHOCYTES # BLD: 1.7 K/UL (ref 1–5.1)
LYMPHOCYTES NFR BLD: 30.3 %
MCH RBC QN AUTO: 34.9 PG (ref 26–34)
MCHC RBC AUTO-ENTMCNC: 34.5 G/DL (ref 31–36)
MCV RBC AUTO: 101.2 FL (ref 80–100)
MONOCYTES # BLD: 0.4 K/UL (ref 0–1.3)
MONOCYTES NFR BLD: 7.7 %
NEUTROPHILS # BLD: 3.4 K/UL (ref 1.7–7.7)
NEUTROPHILS NFR BLD: 61.7 %
PHOSPHATE SERPL-MCNC: 2.3 MG/DL (ref 2.5–4.9)
PLATELET # BLD AUTO: 110 K/UL (ref 135–450)
PMV BLD AUTO: 10.4 FL (ref 5–10.5)
POTASSIUM SERPL-SCNC: 3.7 MMOL/L (ref 3.5–5.1)
PROT SERPL-MCNC: 4.8 G/DL (ref 6.4–8.2)
PROTHROMBIN TIME: 26.5 SEC (ref 11.9–14.9)
RBC # BLD AUTO: 3.02 M/UL (ref 4–5.2)
SODIUM SERPL-SCNC: 140 MMOL/L (ref 136–145)
WBC # BLD AUTO: 5.6 K/UL (ref 4–11)

## 2024-10-30 PROCEDURE — 85610 PROTHROMBIN TIME: CPT

## 2024-10-30 PROCEDURE — 6370000000 HC RX 637 (ALT 250 FOR IP): Performed by: HOSPITALIST

## 2024-10-30 PROCEDURE — 6370000000 HC RX 637 (ALT 250 FOR IP): Performed by: INTERNAL MEDICINE

## 2024-10-30 PROCEDURE — 84100 ASSAY OF PHOSPHORUS: CPT

## 2024-10-30 PROCEDURE — 85025 COMPLETE CBC W/AUTO DIFF WBC: CPT

## 2024-10-30 PROCEDURE — 2500000003 HC RX 250 WO HCPCS: Performed by: FAMILY MEDICINE

## 2024-10-30 PROCEDURE — 97530 THERAPEUTIC ACTIVITIES: CPT

## 2024-10-30 PROCEDURE — 2580000003 HC RX 258: Performed by: FAMILY MEDICINE

## 2024-10-30 PROCEDURE — 2060000000 HC ICU INTERMEDIATE R&B

## 2024-10-30 PROCEDURE — 94760 N-INVAS EAR/PLS OXIMETRY 1: CPT

## 2024-10-30 PROCEDURE — 2700000000 HC OXYGEN THERAPY PER DAY

## 2024-10-30 PROCEDURE — 36415 COLL VENOUS BLD VENIPUNCTURE: CPT

## 2024-10-30 PROCEDURE — 97535 SELF CARE MNGMENT TRAINING: CPT

## 2024-10-30 PROCEDURE — 80053 COMPREHEN METABOLIC PANEL: CPT

## 2024-10-30 PROCEDURE — 94640 AIRWAY INHALATION TREATMENT: CPT

## 2024-10-30 PROCEDURE — 2580000003 HC RX 258: Performed by: HOSPITALIST

## 2024-10-30 PROCEDURE — 99233 SBSQ HOSP IP/OBS HIGH 50: CPT | Performed by: INTERNAL MEDICINE

## 2024-10-30 RX ORDER — WARFARIN SODIUM 2.5 MG/1
2.5 TABLET ORAL
Status: COMPLETED | OUTPATIENT
Start: 2024-10-30 | End: 2024-10-30

## 2024-10-30 RX ADMIN — SODIUM CHLORIDE, PRESERVATIVE FREE 10 ML: 5 INJECTION INTRAVENOUS at 09:15

## 2024-10-30 RX ADMIN — MONTELUKAST 10 MG: 10 TABLET, FILM COATED ORAL at 09:11

## 2024-10-30 RX ADMIN — TRAZODONE HYDROCHLORIDE 100 MG: 100 TABLET ORAL at 21:09

## 2024-10-30 RX ADMIN — IPRATROPIUM BROMIDE AND ALBUTEROL SULFATE 1 DOSE: 2.5; .5 SOLUTION RESPIRATORY (INHALATION) at 07:43

## 2024-10-30 RX ADMIN — BUSPIRONE HYDROCHLORIDE 5 MG: 5 TABLET ORAL at 09:11

## 2024-10-30 RX ADMIN — BUDESONIDE AND FORMOTEROL FUMARATE DIHYDRATE 2 PUFF: 160; 4.5 AEROSOL RESPIRATORY (INHALATION) at 19:39

## 2024-10-30 RX ADMIN — BUTALBITAL, ACETAMINOPHEN AND CAFFEINE 1 TABLET: 325; 50; 40 TABLET ORAL at 04:08

## 2024-10-30 RX ADMIN — SODIUM PHOSPHATE, MONOBASIC, MONOHYDRATE AND SODIUM PHOSPHATE, DIBASIC, ANHYDROUS 15 MMOL: 142; 276 INJECTION, SOLUTION INTRAVENOUS at 06:03

## 2024-10-30 RX ADMIN — BUTALBITAL, ACETAMINOPHEN AND CAFFEINE 1 TABLET: 325; 50; 40 TABLET ORAL at 23:21

## 2024-10-30 RX ADMIN — BUTALBITAL, ACETAMINOPHEN AND CAFFEINE 1 TABLET: 325; 50; 40 TABLET ORAL at 17:38

## 2024-10-30 RX ADMIN — PREDNISONE 40 MG: 20 TABLET ORAL at 09:12

## 2024-10-30 RX ADMIN — SODIUM CHLORIDE, PRESERVATIVE FREE 10 ML: 5 INJECTION INTRAVENOUS at 21:17

## 2024-10-30 RX ADMIN — ESCITALOPRAM OXALATE 10 MG: 10 TABLET ORAL at 09:13

## 2024-10-30 RX ADMIN — ROPINIROLE HYDROCHLORIDE 1 MG: 1 TABLET, FILM COATED ORAL at 21:09

## 2024-10-30 RX ADMIN — IPRATROPIUM BROMIDE AND ALBUTEROL SULFATE 1 DOSE: 2.5; .5 SOLUTION RESPIRATORY (INHALATION) at 19:39

## 2024-10-30 RX ADMIN — SOTALOL HYDROCHLORIDE 80 MG: 80 TABLET ORAL at 09:13

## 2024-10-30 RX ADMIN — DOXYCYCLINE HYCLATE 100 MG: 100 TABLET, COATED ORAL at 09:12

## 2024-10-30 RX ADMIN — BUDESONIDE AND FORMOTEROL FUMARATE DIHYDRATE 2 PUFF: 160; 4.5 AEROSOL RESPIRATORY (INHALATION) at 07:44

## 2024-10-30 RX ADMIN — IPRATROPIUM BROMIDE AND ALBUTEROL SULFATE 1 DOSE: 2.5; .5 SOLUTION RESPIRATORY (INHALATION) at 11:30

## 2024-10-30 RX ADMIN — WARFARIN SODIUM 2.5 MG: 2.5 TABLET ORAL at 17:38

## 2024-10-30 RX ADMIN — BUTALBITAL, ACETAMINOPHEN AND CAFFEINE 1 TABLET: 325; 50; 40 TABLET ORAL at 10:48

## 2024-10-30 RX ADMIN — BUSPIRONE HYDROCHLORIDE 5 MG: 5 TABLET ORAL at 14:15

## 2024-10-30 RX ADMIN — IPRATROPIUM BROMIDE AND ALBUTEROL SULFATE 1 DOSE: 2.5; .5 SOLUTION RESPIRATORY (INHALATION) at 15:59

## 2024-10-30 RX ADMIN — ATORVASTATIN CALCIUM 80 MG: 80 TABLET, FILM COATED ORAL at 21:09

## 2024-10-30 RX ADMIN — LEVOTHYROXINE SODIUM 50 MCG: 0.05 TABLET ORAL at 06:06

## 2024-10-30 RX ADMIN — DOXYCYCLINE HYCLATE 100 MG: 100 TABLET, COATED ORAL at 21:10

## 2024-10-30 RX ADMIN — BUSPIRONE HYDROCHLORIDE 5 MG: 5 TABLET ORAL at 21:10

## 2024-10-30 RX ADMIN — SOTALOL HYDROCHLORIDE 80 MG: 80 TABLET ORAL at 21:09

## 2024-10-30 ASSESSMENT — PAIN DESCRIPTION - LOCATION
LOCATION: HEAD

## 2024-10-30 ASSESSMENT — PAIN SCALES - GENERAL
PAINLEVEL_OUTOF10: 9
PAINLEVEL_OUTOF10: 5
PAINLEVEL_OUTOF10: 5
PAINLEVEL_OUTOF10: 9
PAINLEVEL_OUTOF10: 0
PAINLEVEL_OUTOF10: 9
PAINLEVEL_OUTOF10: 3

## 2024-10-30 ASSESSMENT — PAIN DESCRIPTION - DESCRIPTORS
DESCRIPTORS: ACHING

## 2024-10-30 ASSESSMENT — PAIN DESCRIPTION - ORIENTATION
ORIENTATION: RIGHT;LEFT
ORIENTATION: MID
ORIENTATION: MID
ORIENTATION: MID;RIGHT;LEFT;UPPER

## 2024-10-30 ASSESSMENT — PAIN - FUNCTIONAL ASSESSMENT
PAIN_FUNCTIONAL_ASSESSMENT: ACTIVITIES ARE NOT PREVENTED

## 2024-10-30 ASSESSMENT — PAIN SCALES - WONG BAKER
WONGBAKER_NUMERICALRESPONSE: NO HURT
WONGBAKER_NUMERICALRESPONSE: HURTS LITTLE MORE

## 2024-10-30 ASSESSMENT — PAIN DESCRIPTION - ONSET: ONSET: ON-GOING

## 2024-10-30 ASSESSMENT — PAIN DESCRIPTION - PAIN TYPE: TYPE: ACUTE PAIN;CHRONIC PAIN

## 2024-10-30 ASSESSMENT — PAIN DESCRIPTION - FREQUENCY: FREQUENCY: CONTINUOUS

## 2024-10-30 NOTE — PROGRESS NOTES
Clinical Pharmacy Note  Warfarin Consult    Amanda Smith is a 73 y.o. female receiving warfarin managed by pharmacy.      Warfarin Indication: Afib  Target INR range: 2-3   Dose prior to admission: 5mg daily as of 10/25/24 (per patient INR on 10/25/24 was 3.4 taken by home health nurse. Was told to hold her dose that day and resume at 5mg daily - previously was on 5mg daily except 7.5mg on Wednesdays and Saturday. Patient confirmed she took her 5mg doses on 10/26/24 and 10/27/24)    Current warfarin drug-drug interactions: prednisone  Recent Labs     10/28/24  0354 10/28/24  0833 10/29/24  0338 10/29/24  0339 10/30/24  0401   HGB  --  12.1 12.0  --  10.6*   HCT  --  36.1 35.3*  --  30.6*   INR 1.77*  --   --  1.99* 2.44*       Assessment/Plan:  INR 1.99-->2.44 past 24hr  Will give a lower dose tonight  Will 2.5 mg warfarin tonight.    Thank you for the consult.  Will continue to follow.  Electronically signed by Alonso Blackburn RPH on 10/30/2024 at 8:30 AM

## 2024-10-30 NOTE — PLAN OF CARE
Problem: Discharge Planning  Goal: Discharge to home or other facility with appropriate resources  10/29/2024 2037 by Vanda Clinton RN  Outcome: Progressing  Flowsheets (Taken 10/29/2024 2000)  Discharge to home or other facility with appropriate resources:   Identify barriers to discharge with patient and caregiver   Identify discharge learning needs (meds, wound care, etc)     Problem: Pain  Goal: Verbalizes/displays adequate comfort level or baseline comfort level  10/29/2024 2037 by Vanda Clinton RN  Outcome: Progressing     Problem: Safety - Adult  Goal: Free from fall injury  10/29/2024 2037 by Vanda Clinton RN  Outcome: Progressing  Flowsheets (Taken 10/29/2024 2031)  Free From Fall Injury: Instruct family/caregiver on patient safety     Problem: ABCDS Injury Assessment  Goal: Absence of physical injury  10/29/2024 2037 by Vanda Clinton RN  Outcome: Progressing  Flowsheets (Taken 10/29/2024 2031)  Absence of Physical Injury: Implement safety measures based on patient assessment     Problem: Skin/Tissue Integrity  Goal: Absence of new skin breakdown  Description: 1.  Monitor for areas of redness and/or skin breakdown  2.  Assess vascular access sites hourly  3.  Every 4-6 hours minimum:  Change oxygen saturation probe site  4.  Every 4-6 hours:  If on nasal continuous positive airway pressure, respiratory therapy assess nares and determine need for appliance change or resting period.  10/29/2024 2037 by Vanda Clinton RN  Outcome: Progressing     Problem: Pain  Goal: Verbalizes/displays adequate comfort level or baseline comfort level  10/29/2024 2037 by Vanda Clinton RN  Outcome: Progressing

## 2024-10-30 NOTE — PROGRESS NOTES
NAME:  Amanda Smith  YOB: 1950  MEDICAL RECORD NUMBER:  [id number]    Shift Summary: VSS. Pt declining bipap r/t to continuous headache. No signs of resp distress. PRN pain meds administered.    Family updated: No    Rhythm: Paced     Most recent vitals:   Visit Vitals  /72   Pulse 70   Temp 98.6 °F (37 °C) (Oral)   Resp 20   Ht 1.651 m (5' 5\")   Wt 80.1 kg (176 lb 9.4 oz)   SpO2 97%   BMI 29.39 kg/m²           No data found.    No data found.      Respiratory support needed (if any):  - O2 - NC - 3 lpm    Admission weight Weight - Scale: 74.7 kg (164 lb 10.9 oz) (10/27/24 2242)    Today's weight    Wt Readings from Last 1 Encounters:   10/30/24 80.1 kg (176 lb 9.4 oz)        Mon need assessed each shift: N/A - no mon present  UOP >30ml/hr: YES  Last documented BM (in last 48 hrs):  Patient Vitals for the past 48 hrs:   Last BM (including prior to admit) Stool Occurrence   10/28/24 0800 10/26/24 --   10/28/24 1200 10/28/24 1   10/28/24 2000 10/28/24 --   10/29/24 0800 10/28/24 --   10/29/24 2000 10/28/24 --                Restraints (in use currently or dc'd in last 12 hrs): No    Order current and documentation up to date? No    Lines/Drains reviewed @ bedside.  Peripheral IV 10/27/24 Proximal;Right Forearm (Active)   Number of days: 2         Drip rates at handoff:    sodium chloride 5 mL/hr at 10/29/24 1212       Lab Data:   CBC:   Recent Labs     10/29/24  0338 10/30/24  0401   WBC 7.0 5.6   HGB 12.0 10.6*   HCT 35.3* 30.6*   .3* 101.2*   * 110*     BMP:    Recent Labs     10/29/24  0338 10/30/24  0401    140   K 4.0 3.7   CO2 26 35*   BUN 15 15   CREATININE 0.5* 0.5*     LIVR:   Recent Labs     10/28/24  0354 10/30/24  0401   AST 18 18   ALT 12 13     PT/INR:   Recent Labs     10/29/24  0339 10/30/24  0401   INR 1.99* 2.44*     APTT: No results for input(s): \"APTT\" in the last 72 hours.  ABG: No results for input(s): \"PHART\", \"JJH3RUM\", \"PO2ART\" in the last

## 2024-10-30 NOTE — PROGRESS NOTES
Telephone report called to Miranda PCU RN. Pt to room 5102 in wheelchair on ICU monitor. Bedside handoff complete with this RN and REGINO Jean.

## 2024-10-30 NOTE — PLAN OF CARE
Problem: Discharge Planning  Goal: Discharge to home or other facility with appropriate resources  10/30/2024 1012 by Romina Wong RN  Outcome: Progressing  10/29/2024 2037 by Vanda Clinton RN  Outcome: Progressing  Flowsheets (Taken 10/29/2024 2000)  Discharge to home or other facility with appropriate resources:   Identify barriers to discharge with patient and caregiver   Identify discharge learning needs (meds, wound care, etc)     Problem: Pain  Goal: Verbalizes/displays adequate comfort level or baseline comfort level  10/30/2024 1012 by Romina Wong RN  Outcome: Progressing  10/29/2024 2037 by Vanda Clinton RN  Outcome: Progressing     Problem: Safety - Adult  Goal: Free from fall injury  10/30/2024 1012 by Romina Wong RN  Outcome: Progressing  10/29/2024 2037 by Vanda Clinton RN  Outcome: Progressing  Flowsheets (Taken 10/29/2024 2031)  Free From Fall Injury: Instruct family/caregiver on patient safety     Problem: ABCDS Injury Assessment  Goal: Absence of physical injury  10/30/2024 1012 by Romina Wong RN  Outcome: Progressing  10/29/2024 2037 by Vanda Clinton RN  Outcome: Progressing  Flowsheets (Taken 10/29/2024 2031)  Absence of Physical Injury: Implement safety measures based on patient assessment     Problem: Skin/Tissue Integrity  Goal: Absence of new skin breakdown  Description: 1.  Monitor for areas of redness and/or skin breakdown  2.  Assess vascular access sites hourly  3.  Every 4-6 hours minimum:  Change oxygen saturation probe site  4.  Every 4-6 hours:  If on nasal continuous positive airway pressure, respiratory therapy assess nares and determine need for appliance change or resting period.  10/30/2024 1012 by Romina Wong RN  Outcome: Progressing  10/29/2024 2037 by Vanda Clinton RN  Outcome: Progressing

## 2024-10-30 NOTE — PROGRESS NOTES
V2.0    [facility] [place] Progress Note      Name:  Amanda Smith /Age/Sex: 1950  (73 y.o. female)   MRN & CSN:  [id number] & [id number] Encounter Date/Time: 10/30/2024 9:34 AM EDT   Location:  [id number]/5102-01 PCP: Zackery Powers MD     Attending:Berlin Apodaca MD       Hospital Day: 4    Assessment and Recommendations   Amanda Smith is a 73 y.o. female who presents with Acute on chronic respiratory failure with hypoxia and hypercapnia      Plan:      Acute on chronic respiratory failure with hypoxia and hypercapnia due to COPD exacerbation admitted to ICU, currently transferred to PCU closely monitor oxygen supply oxygen to keep saturation above 90%, continue IV Solu-Medrol and prednisone thereafter   A-fib on sotalol and Coumadin continue for now monitor hemoglobin INR therapeutic this time at 2.44  Chest pain with troponin elevation no history of CAD at this time trend likely type II MI cardiology consulted no further workup recommended  Hypokalemia on admission replaced  Minimally elevated troponin likely due to respiratory failure plan as above  Hypophosphatemia replace with IV supplement  Metabolic acidosis l, repeat CMP  Thrombocytopenia, platelet count 110          Diet ADULT DIET; Regular; Low Sodium (2 gm)   DVT Prophylaxis [] Lovenox, []  Heparin, [] SCDs, [] Ambulation,  [] Eliquis, [] Xarelto  [] Coumadin   Code Status Full Code             Personally reviewed Lab Studies and Imaging     Discussed management of the case with pulmonology who recommended keep on steroids    Telemetry strip independently interpreted by myself heart rate 72 QRS 01 no ST elevation      Drugs that require monitoring for toxicity include steroids and the method of monitoring was blood sugar to avoid toxicity as hyperglycemia    Medical Decision Making:  The following items were considered in medical decision making:  Discussion of patient care with other providers  Reviewed clinical lab      No acute cardiopulmonary findings.       CBC:   Recent Labs     10/27/24  1807 10/28/24  0833 10/29/24  0338 10/30/24  0401   WBC 6.3  --  7.0 5.6   HGB 12.1 12.1 12.0 10.6*   *  --  118* 110*     BMP:    Recent Labs     10/28/24  0354 10/29/24  0338 10/30/24  0401    141 140   K 4.1 4.0 3.7   CL 98* 98* 100   CO2 37* 26 35*   BUN 13 15 15   CREATININE 0.4* 0.5* 0.5*   GLUCOSE 182* 128* 109*     Hepatic:   Recent Labs     10/27/24  1807 10/28/24  0354 10/30/24  0401   AST 20 18 18   ALT 14 12 13   BILITOT 0.4 0.3 <0.2   ALKPHOS 79 77 55     Lipids: No results found for: \"CHOL\", \"HDL\", \"TRIG\"  Hemoglobin A1C: No results found for: \"LABA1C\"  TSH: No results found for: \"TSH\"  Troponin: No results found for: \"TROPONINT\"  Lactic Acid:   Recent Labs     10/28/24  0354   LACTA 1.0     BNP:   Recent Labs     10/27/24  1807   PROBNP 371*     UA:No results found for: \"NITRU\", \"COLORU\", \"PHUR\", \"LABCAST\", \"WBCUA\", \"RBCUA\", \"MUCUS\", \"TRICHOMONAS\", \"YEAST\", \"BACTERIA\", \"CLARITYU\", \"SPECGRAV\", \"LEUKOCYTESUR\", \"UROBILINOGEN\", \"BILIRUBINUR\", \"BLOODU\", \"GLUCOSEU\", \"KETUA\", \"AMORPHOUS\"  Urine Cultures: No results found for: \"LABURIN\"  Blood Cultures:   Lab Results   Component Value Date/Time    BC No Growth after 4 days of incubation. 09/26/2024 07:49 PM     Lab Results   Component Value Date/Time    BLOODCULT2 No Growth after 4 days of incubation. 09/26/2024 07:49 PM     Organism:   Lab Results   Component Value Date/Time    ORG Staph aureus DNA Detected 09/27/2024 12:35 PM         Electronically signed by Berlin Apodaca MD on 10/30/2024 at 9:34 AM  Comment: Please note this report has been produced using speech recognition software and may contain errors related to that system including errors in grammar, punctuation, and spelling, as well as words and phrases that may be inappropriate. If there are any questions or concerns, please feel free to contact the dictating provider for clarification.

## 2024-10-30 NOTE — PROGRESS NOTES
Pt does not want to wear BiPAP tonight. States it give her a headache and that her breathing feels fine. Pt is in no resp distress, on home O2 of 3 L, SpO2 97%.

## 2024-10-30 NOTE — PROGRESS NOTES
Occupational Therapy  Facility/Department: [facility] 5W PROGRESSIVE CARE  Occupational Therapy Treatment Session  Should patient be discharged prior to another treatment session, this note shall serve as the discharge summary.       Name: Amanda Smith  : 1950  MRN: [id number]  Date of Service: 10/30/2024    Discharge Recommendations:  24 hour supervision or assist          Patient Diagnosis(es): The primary encounter diagnosis was COPD exacerbation (HCC). Diagnoses of Acute on chronic respiratory failure with hypoxia and hypercapnia, Elevated troponin, Subtherapeutic international normalized ratio (INR), and Shortness of breath were also pertinent to this visit.  Past Medical History:  has a past medical history of Cardiac dysrhythmia, unspecified, Chronic airway obstruction, not elsewhere classified (HCC), Diverticulitis of colon (without mention of hemorrhage), Hemorrhoids without complication, Hyperlipemia, Leg edema, Mononeuritis of unspecified site, Pneumonia, organism unspecified, Spinal stenosis of lumbar region, and Unspecified hearing loss.  Past Surgical History:  has a past surgical history that includes Appendectomy; back surgery; Colon surgery; colostomy; Thyroidectomy; and colectomy.           Assessment  Performance deficits / Impairments: Decreased functional mobility ;Decreased safe awareness;Decreased balance;Decreased ADL status;Decreased high-level IADLs;Decreased endurance  Assessment: Seen in room, now on 5W.  Pt agreed to OT.  No complaints except minor headache for which she already received medicine.  Pt agreed to OOB.  Moved to EOB with SBA and completed transfer to RW with cues for hand placement with SBA.  Amb with CG/SBA (OT managed heart monitor).  Completed toileting with SBA/CGA.  Pt returned to recliner and set up with needs met.  O2 maintained above 90% on 3L O2 throughout.  Anticipate safe for d/c home with family assist when medically stable.  Pt declines  HHOT.  REQUIRES OT FOLLOW-UP: Yes  Activity Tolerance  Activity Tolerance: Patient limited by fatigue;Patient Tolerated treatment well  Activity Tolerance Comments: O2 sats maintained over 90 on 3L of O2 via NC     Plan  Occupational Therapy Plan  Times Per Week: 3-5x/wk  Current Treatment Recommendations: Strengthening, Balance training, Functional mobility training, Endurance training, Gait training, Safety education & training, Self-Care / ADL    Restrictions  Restrictions/Precautions  Restrictions/Precautions: Fall Risk  Position Activity Restriction  Other position/activity restrictions: O2 3L via NC (3L baseline pta)    Subjective  General  Chart Reviewed: Yes  Patient assessed for rehabilitation services?: Yes  Additional Pertinent Hx: 73 y.o. female admitted 10/27/2024 with Acute on chronic respiratory failure with hypoxia and hypercapnia, COPD exacerbation, and elevated troponin.  Family / Caregiver Present: No  Referring Practitioner: Edie Mathur APRN - CNP  Diagnosis: COPD exacerbation  Subjective  Subjective: Pt seen bedside and agreeable to OT.  Pt stated she has been struggling with a headache but has already received medicine from RN.  General Comment  Comments: per RN ok for therapy     Social/Functional History  Social/Functional History  Lives With: Family (Son and D-I-L.)  Type of Home: House  Home Layout: Multi-level, Able to Live on Main level with bedroom/bathroom, Laundry in basement  Home Access: Stairs to enter with rails  Entrance Stairs - Number of Steps: 4 NICKI  Bathroom Shower/Tub: Tub/Shower unit  Bathroom Toilet: Handicap height  Bathroom Equipment: Tub transfer bench  Bathroom Accessibility: Accessible  Home Equipment: Cane, Electric scooter, Hospital bed, Oxygen, Walker - Rolling, Wheelchair - Manual (3L O2 at baseline)  ADL Assistance:  (primarily sponge bathes d/t limited endurance)  Homemaking Assistance:  (family assist with IADLs)  Ambulation Assistance: Independent (With

## 2024-10-30 NOTE — PROGRESS NOTES
Pulmonary Progress Note    Date of Admission: 10/27/2024   LOS: 3 days     Chief Complaint   Patient presents with    Shortness of Breath     Pt from home with SOB, given duoneb in route.  Pt on 3L at home.  EMS reports diminished lung sounds.       Assessment/Plan:       Acute exacerbation of COPD  -Prednisone 40 mg x 5 days  -Continue Symbicort  -5 days doxycycline    Chronic hypoxemic respiratory failure  -Tolerating home oxygen, can continue to wean goal saturation 90%    From pulmonary standpoint okay for discharge.  Will need to follow-up with Dr. Lara no further inpatient recommendations, we will sign off at this time.  Please let us know if we can be of any further assistance.     24 Hour Events/Subjective  No acute events overnight.    ROS:   No nausea  No Vomiting  No chest pain      Intake/Output Summary (Last 24 hours) at 10/30/2024 1237  Last data filed at 10/30/2024 0913  Gross per 24 hour   Intake 560 ml   Output 1350 ml   Net -790 ml         PHYSICAL EXAM:   Blood pressure (!) 115/59, pulse 73, temperature 98.1 °F (36.7 °C), temperature source Oral, resp. rate 17, height 1.651 m (5' 5\"), weight 80.1 kg (176 lb 9.4 oz), SpO2 100%.'  Gen:  No acute distress.   Resp:  No crackles. No wheezes. No rhonchi. No dullness on percussion.  CV: Regular rate. Regular rhythm. No murmur or rub. No edema.   Neuro:  no focal neurologic deficit.  Moves all extremities  Psych: Awake and alert  Mood stable.      Labs reviewed:  CBC:   Recent Labs     10/27/24  1807 10/28/24  0833 10/29/24  0338 10/30/24  0401   WBC 6.3  --  7.0 5.6   HGB 12.1 12.1 12.0 10.6*   HCT 36.5 36.1 35.3* 30.6*   .9*  --  102.3* 101.2*   *  --  118* 110*     BMP:   Recent Labs     10/28/24  0354 10/29/24  0338 10/29/24  1712 10/30/24  0401    141  --  140   K 4.1 4.0  --  3.7   CL 98* 98*  --  100   CO2 37* 26  --  35*   PHOS  --  1.9*  1.8* 2.8 2.3*   BUN 13 15  --  15   CREATININE 0.4* 0.5*  --  0.5*     LIVER PROFILE:    Recent Labs     10/27/24  1807 10/28/24  0354 10/30/24  0401   AST 20 18 18   ALT 14 12 13   BILIDIR  --  <0.1  --    BILITOT 0.4 0.3 <0.2   ALKPHOS 79 77 55     PT/INR:   Recent Labs     10/28/24  0354 10/29/24  0339 10/30/24  0401   PROTIME 20.7* 22.6* 26.5*   INR 1.77* 1.99* 2.44*           Films:  Radiology Review:  Pertinent images / reports were reviewed as a part of this visit.            This note was transcribed using Dragon Dictation software. Please disregard any translational errors.    Thank you for this consult,    Brayan Barker MD  [facility] Pulmonary, Critical Care, and Sleep Medicine

## 2024-10-30 NOTE — PROGRESS NOTES
Pt transferred to room 5102 at 0635 with ICU RN at bedside. Pt stood and pivoted to bed from wheelchair. Pt is atrial paced on the monitor confirmed by the CMU. Pt is alert and oriented to the room. Fall precautions in place. Call light within reach. Pt denies any further needs at this time.

## 2024-10-31 LAB
INR PPP: 1.91 (ref 0.85–1.15)
PHOSPHATE SERPL-MCNC: 2.8 MG/DL (ref 2.5–4.9)
PROTHROMBIN TIME: 22 SEC (ref 11.9–14.9)

## 2024-10-31 PROCEDURE — 97110 THERAPEUTIC EXERCISES: CPT

## 2024-10-31 PROCEDURE — 6370000000 HC RX 637 (ALT 250 FOR IP): Performed by: HOSPITALIST

## 2024-10-31 PROCEDURE — 6370000000 HC RX 637 (ALT 250 FOR IP): Performed by: INTERNAL MEDICINE

## 2024-10-31 PROCEDURE — 84100 ASSAY OF PHOSPHORUS: CPT

## 2024-10-31 PROCEDURE — 2580000003 HC RX 258: Performed by: HOSPITALIST

## 2024-10-31 PROCEDURE — 94640 AIRWAY INHALATION TREATMENT: CPT

## 2024-10-31 PROCEDURE — 97530 THERAPEUTIC ACTIVITIES: CPT

## 2024-10-31 PROCEDURE — 94761 N-INVAS EAR/PLS OXIMETRY MLT: CPT

## 2024-10-31 PROCEDURE — 36415 COLL VENOUS BLD VENIPUNCTURE: CPT

## 2024-10-31 PROCEDURE — 85610 PROTHROMBIN TIME: CPT

## 2024-10-31 PROCEDURE — 2060000000 HC ICU INTERMEDIATE R&B

## 2024-10-31 PROCEDURE — 2700000000 HC OXYGEN THERAPY PER DAY

## 2024-10-31 RX ORDER — WARFARIN SODIUM 5 MG/1
5 TABLET ORAL
Status: COMPLETED | OUTPATIENT
Start: 2024-10-31 | End: 2024-10-31

## 2024-10-31 RX ADMIN — ESCITALOPRAM OXALATE 10 MG: 10 TABLET ORAL at 08:30

## 2024-10-31 RX ADMIN — IPRATROPIUM BROMIDE AND ALBUTEROL SULFATE 1 DOSE: 2.5; .5 SOLUTION RESPIRATORY (INHALATION) at 19:25

## 2024-10-31 RX ADMIN — BUDESONIDE AND FORMOTEROL FUMARATE DIHYDRATE 2 PUFF: 160; 4.5 AEROSOL RESPIRATORY (INHALATION) at 08:36

## 2024-10-31 RX ADMIN — SODIUM CHLORIDE, PRESERVATIVE FREE 10 ML: 5 INJECTION INTRAVENOUS at 08:30

## 2024-10-31 RX ADMIN — WARFARIN SODIUM 5 MG: 5 TABLET ORAL at 11:10

## 2024-10-31 RX ADMIN — BUSPIRONE HYDROCHLORIDE 5 MG: 5 TABLET ORAL at 08:30

## 2024-10-31 RX ADMIN — DOXYCYCLINE HYCLATE 100 MG: 100 TABLET, COATED ORAL at 20:26

## 2024-10-31 RX ADMIN — SODIUM CHLORIDE, PRESERVATIVE FREE 10 ML: 5 INJECTION INTRAVENOUS at 20:26

## 2024-10-31 RX ADMIN — BUTALBITAL, ACETAMINOPHEN AND CAFFEINE 1 TABLET: 325; 50; 40 TABLET ORAL at 11:34

## 2024-10-31 RX ADMIN — SOTALOL HYDROCHLORIDE 80 MG: 80 TABLET ORAL at 20:26

## 2024-10-31 RX ADMIN — MONTELUKAST 10 MG: 10 TABLET, FILM COATED ORAL at 08:30

## 2024-10-31 RX ADMIN — BUTALBITAL, ACETAMINOPHEN AND CAFFEINE 1 TABLET: 325; 50; 40 TABLET ORAL at 17:32

## 2024-10-31 RX ADMIN — IPRATROPIUM BROMIDE AND ALBUTEROL SULFATE 1 DOSE: 2.5; .5 SOLUTION RESPIRATORY (INHALATION) at 08:37

## 2024-10-31 RX ADMIN — BUSPIRONE HYDROCHLORIDE 5 MG: 5 TABLET ORAL at 15:22

## 2024-10-31 RX ADMIN — ATORVASTATIN CALCIUM 80 MG: 80 TABLET, FILM COATED ORAL at 20:26

## 2024-10-31 RX ADMIN — BUDESONIDE AND FORMOTEROL FUMARATE DIHYDRATE 2 PUFF: 160; 4.5 AEROSOL RESPIRATORY (INHALATION) at 19:25

## 2024-10-31 RX ADMIN — DOXYCYCLINE HYCLATE 100 MG: 100 TABLET, COATED ORAL at 08:30

## 2024-10-31 RX ADMIN — SOTALOL HYDROCHLORIDE 80 MG: 80 TABLET ORAL at 08:30

## 2024-10-31 RX ADMIN — PREDNISONE 40 MG: 20 TABLET ORAL at 08:30

## 2024-10-31 RX ADMIN — BUSPIRONE HYDROCHLORIDE 5 MG: 5 TABLET ORAL at 20:26

## 2024-10-31 RX ADMIN — TRAZODONE HYDROCHLORIDE 100 MG: 100 TABLET ORAL at 20:26

## 2024-10-31 RX ADMIN — LEVOTHYROXINE SODIUM 50 MCG: 0.05 TABLET ORAL at 05:50

## 2024-10-31 RX ADMIN — IPRATROPIUM BROMIDE AND ALBUTEROL SULFATE 1 DOSE: 2.5; .5 SOLUTION RESPIRATORY (INHALATION) at 12:55

## 2024-10-31 RX ADMIN — ROPINIROLE HYDROCHLORIDE 1 MG: 1 TABLET, FILM COATED ORAL at 20:26

## 2024-10-31 RX ADMIN — IPRATROPIUM BROMIDE AND ALBUTEROL SULFATE 1 DOSE: 2.5; .5 SOLUTION RESPIRATORY (INHALATION) at 15:42

## 2024-10-31 RX ADMIN — BUTALBITAL, ACETAMINOPHEN AND CAFFEINE 1 TABLET: 325; 50; 40 TABLET ORAL at 05:55

## 2024-10-31 ASSESSMENT — PAIN DESCRIPTION - ONSET
ONSET: ON-GOING
ONSET: ON-GOING

## 2024-10-31 ASSESSMENT — PAIN SCALES - GENERAL
PAINLEVEL_OUTOF10: 9
PAINLEVEL_OUTOF10: 6
PAINLEVEL_OUTOF10: 4
PAINLEVEL_OUTOF10: 6
PAINLEVEL_OUTOF10: 10
PAINLEVEL_OUTOF10: 5
PAINLEVEL_OUTOF10: 10
PAINLEVEL_OUTOF10: 8

## 2024-10-31 ASSESSMENT — PAIN SCALES - WONG BAKER: WONGBAKER_NUMERICALRESPONSE: NO HURT

## 2024-10-31 ASSESSMENT — PAIN DESCRIPTION - LOCATION
LOCATION: HEAD
LOCATION: HEAD
LOCATION: HEAD;BACK
LOCATION: HEAD

## 2024-10-31 ASSESSMENT — PAIN DESCRIPTION - DESCRIPTORS
DESCRIPTORS: ACHING;SHOOTING
DESCRIPTORS: ACHING;SHOOTING
DESCRIPTORS: ACHING

## 2024-10-31 ASSESSMENT — PAIN - FUNCTIONAL ASSESSMENT
PAIN_FUNCTIONAL_ASSESSMENT: ACTIVITIES ARE NOT PREVENTED

## 2024-10-31 ASSESSMENT — PAIN DESCRIPTION - ORIENTATION
ORIENTATION: RIGHT;LEFT
ORIENTATION: POSTERIOR
ORIENTATION: POSTERIOR
ORIENTATION: POSTERIOR;UPPER

## 2024-10-31 ASSESSMENT — PAIN DESCRIPTION - FREQUENCY
FREQUENCY: CONTINUOUS
FREQUENCY: CONTINUOUS

## 2024-10-31 ASSESSMENT — PAIN DESCRIPTION - PAIN TYPE
TYPE: ACUTE PAIN;CHRONIC PAIN
TYPE: ACUTE PAIN;CHRONIC PAIN

## 2024-10-31 NOTE — PROGRESS NOTES
Patient complaining of headaches. Complains of pain in her posterior head that goes down into her neck. She states at home she takes Aleve but that doesn't really help and tylenol doesn't help either. Pt said she is getting the best relief with the Fioricet that is ordered here and she is hoping she can get that at home. Will mention this to the physician when he rounds.

## 2024-10-31 NOTE — PROGRESS NOTES
Clinical Pharmacy Note  Warfarin Consult    Amanda Smith is a 73 y.o. female receiving warfarin managed by pharmacy.      Warfarin Indication: Afib  Target INR range: 2-3   Dose prior to admission: 5mg daily as of 10/25/24 (per patient INR on 10/25/24 was 3.4 taken by home health nurse. Was told to hold her dose that day and resume at 5mg daily - previously was on 5mg daily except 7.5mg on Wednesdays and Saturday.     Current warfarin drug-drug interactions: prednisone/Doxycyline    Recent Labs     10/28/24  0833 10/29/24  0338 10/29/24  0339 10/30/24  0401 10/31/24  0405   HGB 12.1 12.0  --  10.6*  --    HCT 36.1 35.3*  --  30.6*  --    INR  --   --  1.99* 2.44* 1.91*       Assessment/Plan:  INR dropped to 1.91 today after 2.5mg dose 10-30  Will 5 mg warfarin today at noon    Thank you for the consult.  Will continue to follow.  Electronically signed by Damon Jenkins RPH on 10/31/2024 at 7:00 AM

## 2024-10-31 NOTE — PROGRESS NOTES
Informed Dr. Apodaca that neurology is not going to see pt until tomorrow per Dr. Long. Dr. Apodaca requested that this nurse call [facility] to try and set up an appointment in the next couple days so pt can be discharged to home. This nurse called [facility] and ultimately was on hold for a long time then sent to a voicemail. Message left with request for appointment. Perfect serve sent to Dr. Apodaca informing him of above.

## 2024-10-31 NOTE — PLAN OF CARE
Problem: Discharge Planning  Goal: Discharge to home or other facility with appropriate resources  Outcome: Progressing  Flowsheets (Taken 10/29/2024 2000 by Vanda Clinton, RN)  Discharge to home or other facility with appropriate resources:   Identify barriers to discharge with patient and caregiver   Identify discharge learning needs (meds, wound care, etc)     Problem: Pain  Goal: Verbalizes/displays adequate comfort level or baseline comfort level  Outcome: Progressing  Flowsheets (Taken 10/28/2024 1951 by Sofia Cunningham RN)  Verbalizes/displays adequate comfort level or baseline comfort level:   Encourage patient to monitor pain and request assistance   Assess pain using appropriate pain scale   Administer analgesics based on type and severity of pain and evaluate response     Problem: Safety - Adult  Goal: Free from fall injury  Outcome: Progressing  Flowsheets (Taken 10/29/2024 2031 by Vanda Clinton, RN)  Free From Fall Injury: Instruct family/caregiver on patient safety     Problem: ABCDS Injury Assessment  Goal: Absence of physical injury  Outcome: Progressing  Flowsheets (Taken 10/29/2024 2031 by Vanda Clinton, RN)  Absence of Physical Injury: Implement safety measures based on patient assessment

## 2024-10-31 NOTE — PROGRESS NOTES
Occupational Therapy Attempt/Discharge Note  Amanda Smith  1950  [id number]    Per PT note, pt functioning at baseline and has no further needs for acute OT at this time. Will sign off. Please refer to last therapy note as discharge summary.     Electronically signed by KITTY Quinones/L on 10/31/2024 at 3:44 PM

## 2024-10-31 NOTE — PLAN OF CARE
Problem: Discharge Planning  Goal: Discharge to home or other facility with appropriate resources  10/31/2024 1009 by Capri Underwood RN  Outcome: Progressing  Note: Prior to admission pt was living at home and plans to returns to that environment. Social work/case management available for discharge needs.     Problem: Pain  Goal: Verbalizes/displays adequate comfort level or baseline comfort level  10/31/2024 1009 by Capri Underwood RN  Outcome: Progressing  Note: Pain/discomfort being managed with PRN analgesics per MD orders. Pt able to express presence and absence of pain and rate pain appropriately using numerical scale.      Problem: Safety - Adult  Goal: Free from fall injury  10/31/2024 1009 by Capri Underwood RN  Outcome: Progressing  Note: Fall risk assessment completed every shift. All precautions in place. Pt has call light within reach at all times. Room clear of clutter. Pt aware to call for assistance when getting up.  Pt is compliant with calling for help prior to getting up.     Problem: ABCDS Injury Assessment  Goal: Absence of physical injury  10/31/2024 1009 by Capri Underwood RN  Outcome: Progressing  Note: No signs of physical injury.     Problem: Skin/Tissue Integrity  Goal: Absence of new skin breakdown  Description: 1.  Monitor for areas of redness and/or skin breakdown  2.  Assess vascular access sites hourly  3.  Every 4-6 hours minimum:  Change oxygen saturation probe site  4.  Every 4-6 hours:  If on nasal continuous positive airway pressure, respiratory therapy assess nares and determine need for appliance change or resting period.  Outcome: Progressing  Note: Skin assessment completed every shift. Pt assessed for incontinence, appropriate barrier cream applied prn.  Pt encouraged to turn/rotate every 2 hours. Assistance provided if pt unable to do so themselves.   Pt able to turn herself independently in bed and change her position in bed.

## 2024-10-31 NOTE — PROGRESS NOTES
Physical Therapy  Facility/Department: [facility] 5 PROGRESSIVE CARE  Daily Treatment Note/Discharge Summary  NAME: Amanda Smith  : 1950  MRN: [id number]    Date of Service: 10/31/2024    Discharge Recommendations:  Home with assist PRN        Patient Diagnosis(es): The primary encounter diagnosis was COPD exacerbation (HCC). Diagnoses of Acute on chronic respiratory failure with hypoxia and hypercapnia, Elevated troponin, Subtherapeutic international normalized ratio (INR), and Shortness of breath were also pertinent to this visit.    Assessment  Assessment: Pt ambulated 200' (I) with RW in room.  She completed self-care tasks as well, managing O2 line without assist.  Wants to go home today.  Safe to be up ad rommel.  Pt appears to be functioning at/near their baseline for functional mobility and does not require additional therapy in the acute setting.  Anticipate safe return home with prn assist.     Amanda Smith scored a 24/24 on the AM-PAC short mobility form.     If patient discharges prior to next session this note will serve as a discharge summary.  Please see below for the latest assessment towards goals.        Activity Tolerance: Patient limited by fatigue    Plan  Home prn       Restrictions  Restrictions/Precautions  Restrictions/Precautions: Fall Risk  Position Activity Restriction  Other position/activity restrictions: O2 3L via NC (3L baseline pta)     Subjective   Subjective  Subjective: Agreeable to activity.  Wants to go home today.  Orientation  Overall Orientation Status: Within Functional Limits    Objective     Bed Mobility Training  Bed Mobility Training: Yes  Overall Level of Assistance: Independent  Rolling: Independent  Supine to Sit: Independent  Sit to Supine: Independent    Transfer Training  Transfer Training: Yes  Overall Level of Assistance: Independent  Sit to Stand: Independent  Stand to Sit: Independent    Gait  Gait Training: Yes  Overall Level of Assistance:  SCDs

## 2024-10-31 NOTE — PROGRESS NOTES
V2.0    [facility] Progress Note      Name:  Amanda Smith /Age/Sex: 1950  (73 y.o. female)   MRN & CSN:  [id number] & [id number] Encounter Date/Time: 10/31/2024 1:31 PM EDT   Location:  [id number] PCP: Zackery Powers MD     Attending:Berlin Apodaca MD       Hospital Day: 5    Assessment and Recommendations   Amanda Smith is a 73 y.o. female who presents with Acute on chronic respiratory failure with hypoxia and hypercapnia      Plan:   Acute on chronic respiratory failure with hypoxia and hypercapnia due to COPD exacerbation admitted to ICU, currently transferred to PCU closely monitor oxygen supply oxygen to keep saturation above 90%, continue steroids for now  A-fib on sotalol and Coumadin continue for now monitor hemoglobin INR noted  Chest pain with troponin elevation no history of CAD at this time trend likely type II MI cardiology consulted no further workup recommended  Minimally elevated troponin likely due to respiratory failure plan as above  Metabolic acidosis improved  Headache, initially was worse on presentation patient stated that it has been having headache for 2 months will consult neurology for recommendations        Diet ADULT DIET; Regular; Low Sodium (2 gm)   DVT Prophylaxis [] Lovenox, []  Heparin, [] SCDs, [] Ambulation,  [] Eliquis, [] Xarelto  [] Coumadin   Code Status Full Code             Personally reviewed Lab Studies and Imaging         Medical Decision Making:  The following items were considered in medical decision making:  Discussion of patient care with other providers  Reviewed clinical lab tests  Reviewed radiology tests  Reviewed other diagnostic tests/interventions  Independent review of radiologic images  Microbiology cultures and other micro tests reviewed      Subjective:     Chief Complaint: Shortness of breath headache    Amanda Smith is a 73 y.o. female who presents with shortness of breath cough and patient having headache

## 2024-11-01 VITALS
DIASTOLIC BLOOD PRESSURE: 52 MMHG | RESPIRATION RATE: 18 BRPM | TEMPERATURE: 98.5 F | WEIGHT: 174.82 LBS | BODY MASS INDEX: 29.13 KG/M2 | SYSTOLIC BLOOD PRESSURE: 103 MMHG | HEART RATE: 75 BPM | HEIGHT: 65 IN | OXYGEN SATURATION: 98 %

## 2024-11-01 LAB
ALBUMIN SERPL-MCNC: 2.9 G/DL (ref 3.4–5)
ANION GAP SERPL CALCULATED.3IONS-SCNC: 7 MMOL/L (ref 3–16)
BUN SERPL-MCNC: 14 MG/DL (ref 7–20)
CALCIUM SERPL-MCNC: 8.3 MG/DL (ref 8.3–10.6)
CHLORIDE SERPL-SCNC: 102 MMOL/L (ref 99–110)
CO2 SERPL-SCNC: 35 MMOL/L (ref 21–32)
CREAT SERPL-MCNC: 0.5 MG/DL (ref 0.6–1.2)
GFR SERPLBLD CREATININE-BSD FMLA CKD-EPI: >90 ML/MIN/{1.73_M2}
GLUCOSE SERPL-MCNC: 110 MG/DL (ref 70–99)
INR PPP: 1.76 (ref 0.85–1.15)
PHOSPHATE SERPL-MCNC: 2.8 MG/DL (ref 2.5–4.9)
POTASSIUM SERPL-SCNC: 3.5 MMOL/L (ref 3.5–5.1)
PROTHROMBIN TIME: 20.7 SEC (ref 11.9–14.9)
SODIUM SERPL-SCNC: 144 MMOL/L (ref 136–145)

## 2024-11-01 PROCEDURE — 6370000000 HC RX 637 (ALT 250 FOR IP): Performed by: HOSPITALIST

## 2024-11-01 PROCEDURE — 85610 PROTHROMBIN TIME: CPT

## 2024-11-01 PROCEDURE — 6370000000 HC RX 637 (ALT 250 FOR IP): Performed by: INTERNAL MEDICINE

## 2024-11-01 PROCEDURE — 36415 COLL VENOUS BLD VENIPUNCTURE: CPT

## 2024-11-01 PROCEDURE — 94640 AIRWAY INHALATION TREATMENT: CPT

## 2024-11-01 PROCEDURE — 80069 RENAL FUNCTION PANEL: CPT

## 2024-11-01 PROCEDURE — 94760 N-INVAS EAR/PLS OXIMETRY 1: CPT

## 2024-11-01 PROCEDURE — 2580000003 HC RX 258: Performed by: HOSPITALIST

## 2024-11-01 PROCEDURE — 2700000000 HC OXYGEN THERAPY PER DAY

## 2024-11-01 RX ORDER — PREDNISONE 20 MG/1
40 TABLET ORAL DAILY
Qty: 2 TABLET | Refills: 0 | Status: SHIPPED | OUTPATIENT
Start: 2024-11-02 | End: 2024-11-03

## 2024-11-01 RX ORDER — DOXYCYCLINE HYCLATE 100 MG
100 TABLET ORAL EVERY 12 HOURS SCHEDULED
Qty: 2 TABLET | Refills: 0 | Status: SHIPPED | OUTPATIENT
Start: 2024-11-01 | End: 2024-11-02

## 2024-11-01 RX ORDER — WARFARIN SODIUM 7.5 MG/1
7.5 TABLET ORAL
Status: COMPLETED | OUTPATIENT
Start: 2024-11-01 | End: 2024-11-01

## 2024-11-01 RX ADMIN — BUTALBITAL, ACETAMINOPHEN AND CAFFEINE 1 TABLET: 325; 50; 40 TABLET ORAL at 05:44

## 2024-11-01 RX ADMIN — BUTALBITAL, ACETAMINOPHEN AND CAFFEINE 1 TABLET: 325; 50; 40 TABLET ORAL at 12:05

## 2024-11-01 RX ADMIN — IPRATROPIUM BROMIDE AND ALBUTEROL SULFATE 1 DOSE: 2.5; .5 SOLUTION RESPIRATORY (INHALATION) at 16:16

## 2024-11-01 RX ADMIN — IPRATROPIUM BROMIDE AND ALBUTEROL SULFATE 1 DOSE: 2.5; .5 SOLUTION RESPIRATORY (INHALATION) at 07:29

## 2024-11-01 RX ADMIN — SODIUM CHLORIDE, PRESERVATIVE FREE 10 ML: 5 INJECTION INTRAVENOUS at 08:53

## 2024-11-01 RX ADMIN — DOXYCYCLINE HYCLATE 100 MG: 100 TABLET, COATED ORAL at 08:51

## 2024-11-01 RX ADMIN — BUDESONIDE AND FORMOTEROL FUMARATE DIHYDRATE 2 PUFF: 160; 4.5 AEROSOL RESPIRATORY (INHALATION) at 07:28

## 2024-11-01 RX ADMIN — BUSPIRONE HYDROCHLORIDE 5 MG: 5 TABLET ORAL at 14:06

## 2024-11-01 RX ADMIN — MONTELUKAST 10 MG: 10 TABLET, FILM COATED ORAL at 08:51

## 2024-11-01 RX ADMIN — IPRATROPIUM BROMIDE AND ALBUTEROL SULFATE 1 DOSE: 2.5; .5 SOLUTION RESPIRATORY (INHALATION) at 11:06

## 2024-11-01 RX ADMIN — LEVOTHYROXINE SODIUM 50 MCG: 0.05 TABLET ORAL at 05:42

## 2024-11-01 RX ADMIN — BUSPIRONE HYDROCHLORIDE 5 MG: 5 TABLET ORAL at 08:51

## 2024-11-01 RX ADMIN — WARFARIN SODIUM 7.5 MG: 7.5 TABLET ORAL at 17:42

## 2024-11-01 RX ADMIN — SOTALOL HYDROCHLORIDE 80 MG: 80 TABLET ORAL at 08:51

## 2024-11-01 RX ADMIN — PREDNISONE 40 MG: 20 TABLET ORAL at 08:51

## 2024-11-01 RX ADMIN — ESCITALOPRAM OXALATE 10 MG: 10 TABLET ORAL at 08:51

## 2024-11-01 ASSESSMENT — PAIN - FUNCTIONAL ASSESSMENT
PAIN_FUNCTIONAL_ASSESSMENT: PREVENTS OR INTERFERES SOME ACTIVE ACTIVITIES AND ADLS
PAIN_FUNCTIONAL_ASSESSMENT: ACTIVITIES ARE NOT PREVENTED

## 2024-11-01 ASSESSMENT — PAIN DESCRIPTION - ORIENTATION: ORIENTATION: POSTERIOR

## 2024-11-01 ASSESSMENT — PAIN DESCRIPTION - LOCATION
LOCATION: HEAD
LOCATION: HEAD

## 2024-11-01 ASSESSMENT — PAIN SCALES - GENERAL
PAINLEVEL_OUTOF10: 8
PAINLEVEL_OUTOF10: 8
PAINLEVEL_OUTOF10: 0

## 2024-11-01 ASSESSMENT — PAIN DESCRIPTION - DESCRIPTORS
DESCRIPTORS: ACHING
DESCRIPTORS: ACHING

## 2024-11-01 ASSESSMENT — PAIN SCALES - WONG BAKER
WONGBAKER_NUMERICALRESPONSE: NO HURT
WONGBAKER_NUMERICALRESPONSE: NO HURT

## 2024-11-01 ASSESSMENT — PAIN DESCRIPTION - PAIN TYPE: TYPE: CHRONIC PAIN

## 2024-11-01 NOTE — CARE COORDINATION
Chart reviewed. Discharge plan is home with home care through [facility] for skilled nursing. Patient has oxygen at home baseline 3LPM with [employer]. Patient's family will transport her home. REGINO MONTEMAYOR will continue to follow for additional needs.   Electronically signed by Ml Talbert RN on 11/1/2024 at 4:19 PM

## 2024-11-01 NOTE — PLAN OF CARE
Problem: Discharge Planning  Goal: Discharge to home or other facility with appropriate resources  10/31/2024 2340 by Yvette Gomez RN  Outcome: Progressing  10/31/2024 1009 by Capri Underwood RN  Outcome: Progressing  Note: Prior to admission pt was living at home and plans to returns to that environment. Social work/case management available for discharge needs.     Problem: Pain  Goal: Verbalizes/displays adequate comfort level or baseline comfort level  10/31/2024 2340 by Yvette Gomez RN  Outcome: Progressing  10/31/2024 1009 by Capri Underwood RN  Outcome: Progressing  Note: Pain/discomfort being managed with PRN analgesics per MD orders. Pt able to express presence and absence of pain and rate pain appropriately using numerical scale.      Problem: Safety - Adult  Goal: Free from fall injury  10/31/2024 2340 by Yvette Gomez RN  Outcome: Progressing  10/31/2024 1009 by Capri Underwood RN  Outcome: Progressing  Note: Fall risk assessment completed every shift. All precautions in place. Pt has call light within reach at all times. Room clear of clutter. Pt aware to call for assistance when getting up.  Pt is compliant with calling for help prior to getting up.     Problem: ABCDS Injury Assessment  Goal: Absence of physical injury  10/31/2024 2340 by Yvette Gomez RN  Outcome: Progressing  10/31/2024 1009 by Capri Underwood RN  Outcome: Progressing  Note: No signs of physical injury.     Problem: Skin/Tissue Integrity  Goal: Absence of new skin breakdown  Description: 1.  Monitor for areas of redness and/or skin breakdown  2.  Assess vascular access sites hourly  3.  Every 4-6 hours minimum:  Change oxygen saturation probe site  4.  Every 4-6 hours:  If on nasal continuous positive airway pressure, respiratory therapy assess nares and determine need for appliance change or resting period.  10/31/2024 2340 by Yvette Gomez RN  Outcome: Progressing  10/31/2024 1009 by Capri Underwood RN  Outcome:

## 2024-11-01 NOTE — PLAN OF CARE
Problem: Discharge Planning  Goal: Discharge to home or other facility with appropriate resources  11/1/2024 1012 by Saran Weber RN  Outcome: Progressing     Problem: Pain  Goal: Verbalizes/displays adequate comfort level or baseline comfort level  11/1/2024 1012 by Saran Weber RN  Outcome: Progressing     Problem: Safety - Adult  Goal: Free from fall injury  11/1/2024 1012 by Saran Weber RN  Outcome: Progressing     Problem: ABCDS Injury Assessment  Goal: Absence of physical injury  11/1/2024 1012 by Saran Weber RN  Outcome: Progressing     Problem: Skin/Tissue Integrity  Goal: Absence of new skin breakdown  Description: 1.  Monitor for areas of redness and/or skin breakdown  2.  Assess vascular access sites hourly  3.  Every 4-6 hours minimum:  Change oxygen saturation probe site  4.  Every 4-6 hours:  If on nasal continuous positive airway pressure, respiratory therapy assess nares and determine need for appliance change or resting period.  11/1/2024 1012 by Saran Weber RN  Outcome: Progressing

## 2024-11-01 NOTE — DISCHARGE SUMMARY
Hospital Medicine Discharge Summary    Patient ID: Amanda Smith      Patient's PCP: Zackery Powers MD    Admit Date: 10/27/2024     Discharge Date:   11/01/2024    Admitting Provider: Yara Isidro Jr., MD     Discharge Provider: [place] Apodaca MD     Discharge Diagnoses:       Active Hospital Problems    Diagnosis     Elevated troponin [R79.89]     Acute on chronic respiratory failure with hypoxia and hypercapnia [J96.21, J96.22]     COPD exacerbation (HCC) [J44.1]        The patient was seen and examined on day of discharge and this discharge summary is in conjunction with any daily progress note from day of discharge.    Hospital Course:   From HPI:\"73f with history of copd on 3 lpm NC O2, continued cigarette use, afib, presents to the ER following an episode of chills, palpitations, diaphoresis increased sob and chest pain with onset at rest at 2 pm today. Patient denies increased cough, fever. Chest pain lasted 2 minutes, resolved. Of note, patient states she was told per squad that her oxygen appeared to have become unattached.   Currently patient appears to be awake, alert, oriented, denying chest pain, worsening sob.\"      Acute on chronic respiratory failure with hypoxia and hypercapnia due to COPD exacerbation admitted to ICU, currently transferred to PCU closely monitor oxygen supply oxygen to keep saturation above 90%, complete steroid course  A-fib on sotalol and Coumadin continue for now monitor hemoglobin INR noted  Chest pain with troponin elevation no history of CAD at this time trend likely type II MI cardiology consulted no further workup recommended  Minimally elevated troponin likely due to respiratory failure plan as above  Metabolic acidosis improved  Headache, initially was worse on presentation patient stated that it has been having headache for 2 months consulted neurology improved significantly today discussed with neurology okay to discharge and follow-up with PCP  phrases that may be inappropriate. If there are any questions or concerns, please feel free to contact the dictating provider for clarification.

## 2024-11-01 NOTE — PROGRESS NOTES
Clinical Pharmacy Note  Warfarin Consult    Amanda Smith is a 73 y.o. female receiving warfarin managed by pharmacy.      Warfarin Indication: Afib  Target INR range: 2-3   Dose prior to admission: 5mg daily as of 10/25/24 (per patient INR on 10/25/24 was 3.4 taken by home health nurse. Was told to hold her dose that day and resume at 5mg daily - previously was on 5mg daily except 7.5mg on Wednesdays and Saturday.     Current warfarin drug-drug interactions: prednisone/Doxycyline    Recent Labs     10/30/24  0401 10/31/24  0405 11/01/24  0407   HGB 10.6*  --   --    HCT 30.6*  --   --    INR 2.44* 1.91* 1.76*       Assessment/Plan:  INR dropped to 1.91 today after 2.5mg dose 10-30 and dropped to 1.76 after 5mg dose 10/31  Will 7.5 mg warfarin today    Thank you for the consult.  Will continue to follow.  Electronically signed by Alonso Blackburn RPH on 11/1/2024 at 12:43 PM

## 2024-11-01 NOTE — PROGRESS NOTES
Discharge orders acknowledged by RN . Discharge teaching completed with pt and family. AVS reviewed and all questions answered. Medication regimen reviewed and pt understands schedule. Follow up appointments also reviewed with pt and resources given for discharge. IV removed with no complications. Portable monitor #2 removed from pt and returned to CMU by this RN. 60+ minutes of education completed. Required core measures completed. Pt vitals WDL. Pt discharged with all belongings including purse to home with son. Pt transported off of unit via wheelchair. No complications.  Electronically signed by Saran Weber RN on 11/1/24 at 6:06 PM EDT

## 2024-11-01 NOTE — CONSULTS
Neurology Consult Note  Reason for Consult: headache for 2 months    Chief complaint: shortness of breath    Berlin Jackson MD asked me to see Amanda Smith in consultation for evaluation of headache for 2 months    History of Present Illness:  Amanda Smith is a 73 y.o. female who presents with shortness of breath.     I obtained my information via interview w/ the patient, supplemented by chart review.    The patient says that she came to the hospital 5 days ago because she was having some shortness of breath and thought she may be dying.  She was having cold sweats and a headache.      She reported to the hospitalist yesterday that she had been having headaches for several weeks which prompted a neurology consultation.     She tells me that she has been having a headache for a couple of months, essentially daily.  She said at first she thought it was related to her sinuses.  She says that throbbing pain usually starts in her eyes and radiates to the back of her head/neck.  No associated neurologic symptoms.  Aleve does not work.      She does say that fioricet works here.     Currently she feels comfortable.  She still has a headache.      She has no significant headache history prior to this.      She has a hx of PAF, on warfarin.     Medical History:  Past Medical History:   Diagnosis Date    Cardiac dysrhythmia, unspecified     Chronic airway obstruction, not elsewhere classified (HCC)     Diverticulitis of colon (without mention of hemorrhage)     Hemorrhoids without complication     Hyperlipemia     Leg edema     Mononeuritis of unspecified site     Pneumonia, organism unspecified     Spinal stenosis of lumbar region     Unspecified hearing loss      Past Surgical History:   Procedure Laterality Date    APPENDECTOMY      BACK SURGERY      COLECTOMY      COLON SURGERY      COLOSTOMY      THYROIDECTOMY       Scheduled Meds:   predniSONE  40 mg Oral Daily    doxycycline hyclate  100 mg

## 2024-11-01 NOTE — PROGRESS NOTES
V2.0    [facility] [place] Progress Note      Name:  Amanda Smith /Age/Sex: 1950  (73 y.o. female)   MRN & CSN:  [id number] & [id number] Encounter Date/Time: 2024 1:55 PM EDT   Location:  [id number] PCP: Zackery Powers MD     Attending:Berlin Apodaca MD       Hospital Day: 6    Assessment and Recommendations   Amanda Smith is a 73 y.o. female who presents with Acute on chronic respiratory failure with hypoxia and hypercapnia    73-year-old patient admitted to the hospital with respiratory failure due to COPD initially admitted to ICU pulmonology consulted was on BiPAP improved transferred to regular floor patient is having on and off headache for the last 2 months with some worsening on presentation, currently headache better neurology consulted for recommendations  Plan:   Acute on chronic respiratory failure with hypoxia and hypercapnia due to COPD exacerbation admitted to ICU, currently transferred to PCU closely monitor oxygen supply oxygen to keep saturation above 90%, continue steroids for now plan for discharge soon pending headache workup  A-fib on sotalol and Coumadin continue for now monitor hemoglobin INR noted  Chest pain with troponin elevation no history of CAD at this time trend likely type II MI cardiology consulted no further workup recommended  Minimally elevated troponin likely due to respiratory failure plan as above  Metabolic acidosis improved  Headache, initially was worse on presentation patient stated that it has been having headache for 2 months consulted neurology pending workup        Diet ADULT DIET; Regular; Low Sodium (2 gm)   DVT Prophylaxis [] Lovenox, []  Heparin, [] SCDs, [] Ambulation,  [] Eliquis, [] Xarelto  [] Coumadin   Code Status Full Code             Personally reviewed Lab Studies and Imaging     Discussed management of the case with neurology       Medical Decision Making:  The following items were considered in medical decision  budesonide-formoterol  2 puff Inhalation BID RT    levothyroxine  50 mcg Oral Daily    montelukast  10 mg Oral Daily    rOPINIRole  1 mg Oral Nightly    sotalol  80 mg Oral BID    traZODone  100 mg Oral Nightly    ipratropium 0.5 mg-albuterol 2.5 mg  1 Dose Inhalation Q4H WA RT    sodium chloride flush  5-40 mL IntraVENous 2 times per day    warfarin placeholder: dosing by pharmacy   Other RX Placeholder    nicotine  1 patch TransDERmal Daily      Infusions:    sodium chloride 5 mL/hr at 10/29/24 1212     PRN Meds: sodium phosphate 15 mmol in sodium chloride 0.9 % 250 mL IVPB, 15 mmol, PRN  butalbital-acetaminophen-caffeine, 1 tablet, Q6H PRN  albuterol sulfate HFA, 2 puff, Q6H PRN  furosemide, 20 mg, Daily PRN  promethazine, 25 mg, Q6H PRN  sodium chloride flush, 5-40 mL, PRN  sodium chloride, , PRN  magnesium sulfate, 2,000 mg, PRN  ondansetron, 4 mg, Q8H PRN   Or  ondansetron, 4 mg, Q6H PRN  polyethylene glycol, 17 g, Daily PRN  acetaminophen, 650 mg, Q6H PRN   Or  acetaminophen, 650 mg, Q6H PRN        Labs and Imaging   Echo (TTE) limited (PRN contrast/bubble/strain/3D)    Result Date: 10/29/2024    Limited study.   Left Ventricle: Normal left ventricular systolic function with a visually estimated EF of 55 - 60%. Left ventricle size is normal. Normal wall thickness. Normal wall motion. Normal diastolic function.   Right Ventricle: Right ventricle size is normal. Lead present in the right ventricle. Normal systolic function.     XR CHEST PORTABLE    Result Date: 10/27/2024  EXAMINATION: ONE XRAY VIEW OF THE CHEST 10/27/2024 5:11 pm COMPARISON: Chest radiograph 09/26/2024. HISTORY: ORDERING SYSTEM PROVIDED HISTORY: CP,. SOB TECHNOLOGIST PROVIDED HISTORY: Reason for exam:->CP,. SOB Reason for Exam: cp, sob FINDINGS: Stable positioning of a left chest cardiac support device with leads terminating over the right atrium and ventricle, and spinal stimulator leads terminating over the mid thoracic spine. No focal